# Patient Record
Sex: FEMALE | Race: WHITE | NOT HISPANIC OR LATINO | Employment: FULL TIME | URBAN - METROPOLITAN AREA
[De-identification: names, ages, dates, MRNs, and addresses within clinical notes are randomized per-mention and may not be internally consistent; named-entity substitution may affect disease eponyms.]

---

## 2021-04-08 DIAGNOSIS — Z23 ENCOUNTER FOR IMMUNIZATION: ICD-10-CM

## 2022-03-12 ENCOUNTER — APPOINTMENT (EMERGENCY)
Dept: RADIOLOGY | Facility: HOSPITAL | Age: 57
End: 2022-03-12
Payer: COMMERCIAL

## 2022-03-12 ENCOUNTER — HOSPITAL ENCOUNTER (OUTPATIENT)
Facility: HOSPITAL | Age: 57
Setting detail: OBSERVATION
Discharge: HOME | End: 2022-03-13
Attending: EMERGENCY MEDICINE | Admitting: INTERNAL MEDICINE
Payer: COMMERCIAL

## 2022-03-12 DIAGNOSIS — T78.3XXA ANGIOEDEMA, INITIAL ENCOUNTER: Primary | ICD-10-CM

## 2022-03-12 DIAGNOSIS — K21.9 GASTROESOPHAGEAL REFLUX DISEASE, UNSPECIFIED WHETHER ESOPHAGITIS PRESENT: ICD-10-CM

## 2022-03-12 DIAGNOSIS — R93.89 ABNORMAL CHEST X-RAY: ICD-10-CM

## 2022-03-12 PROBLEM — F41.9 ANXIETY: Status: ACTIVE | Noted: 2022-03-12

## 2022-03-12 LAB
ALBUMIN SERPL BCP-MCNC: 4 G/DL (ref 3.5–5)
ALP SERPL-CCNC: 102 U/L (ref 46–116)
ALT SERPL W P-5'-P-CCNC: 36 U/L (ref 12–78)
ANION GAP SERPL CALCULATED.3IONS-SCNC: 9 MMOL/L (ref 4–13)
APTT PPP: 27 SECONDS (ref 23–37)
AST SERPL W P-5'-P-CCNC: 25 U/L (ref 5–45)
ATRIAL RATE: 71 BPM
BASOPHILS # BLD AUTO: 0.02 THOUSANDS/ΜL (ref 0–0.1)
BASOPHILS NFR BLD AUTO: 0 % (ref 0–1)
BILIRUB SERPL-MCNC: 0.33 MG/DL (ref 0.2–1)
BUN SERPL-MCNC: 21 MG/DL (ref 5–25)
CALCIUM SERPL-MCNC: 9.3 MG/DL (ref 8.3–10.1)
CHLORIDE SERPL-SCNC: 103 MMOL/L (ref 100–108)
CO2 SERPL-SCNC: 28 MMOL/L (ref 21–32)
CREAT SERPL-MCNC: 0.74 MG/DL (ref 0.6–1.3)
EOSINOPHIL # BLD AUTO: 0.08 THOUSAND/ΜL (ref 0–0.61)
EOSINOPHIL NFR BLD AUTO: 1 % (ref 0–6)
ERYTHROCYTE [DISTWIDTH] IN BLOOD BY AUTOMATED COUNT: 12.6 % (ref 11.6–15.1)
GFR SERPL CREATININE-BSD FRML MDRD: 90 ML/MIN/1.73SQ M
GLUCOSE SERPL-MCNC: 110 MG/DL (ref 65–140)
HCT VFR BLD AUTO: 39.4 % (ref 34.8–46.1)
HGB BLD-MCNC: 13.5 G/DL (ref 11.5–15.4)
IMM GRANULOCYTES # BLD AUTO: 0.03 THOUSAND/UL (ref 0–0.2)
IMM GRANULOCYTES NFR BLD AUTO: 1 % (ref 0–2)
INR PPP: 0.9 (ref 0.84–1.19)
LYMPHOCYTES # BLD AUTO: 1.69 THOUSANDS/ΜL (ref 0.6–4.47)
LYMPHOCYTES NFR BLD AUTO: 30 % (ref 14–44)
MCH RBC QN AUTO: 31 PG (ref 26.8–34.3)
MCHC RBC AUTO-ENTMCNC: 34.3 G/DL (ref 31.4–37.4)
MCV RBC AUTO: 90 FL (ref 82–98)
MONOCYTES # BLD AUTO: 0.52 THOUSAND/ΜL (ref 0.17–1.22)
MONOCYTES NFR BLD AUTO: 9 % (ref 4–12)
NEUTROPHILS # BLD AUTO: 3.25 THOUSANDS/ΜL (ref 1.85–7.62)
NEUTS SEG NFR BLD AUTO: 59 % (ref 43–75)
NRBC BLD AUTO-RTO: 0 /100 WBCS
P AXIS: 64 DEGREES
PLATELET # BLD AUTO: 287 THOUSANDS/UL (ref 149–390)
PMV BLD AUTO: 9.3 FL (ref 8.9–12.7)
POTASSIUM SERPL-SCNC: 4.2 MMOL/L (ref 3.5–5.3)
PR INTERVAL: 132 MS
PROT SERPL-MCNC: 7.2 G/DL (ref 6.4–8.2)
PROTHROMBIN TIME: 12.2 SECONDS (ref 11.6–14.5)
QRS AXIS: 53 DEGREES
QRSD INTERVAL: 88 MS
QT INTERVAL: 386 MS
QTC INTERVAL: 419 MS
RBC # BLD AUTO: 4.36 MILLION/UL (ref 3.81–5.12)
S PYO DNA THROAT QL NAA+PROBE: NOT DETECTED
SODIUM SERPL-SCNC: 140 MMOL/L (ref 136–145)
T WAVE AXIS: 67 DEGREES
VENTRICULAR RATE: 71 BPM
WBC # BLD AUTO: 5.59 THOUSAND/UL (ref 4.31–10.16)

## 2022-03-12 PROCEDURE — 86160 COMPLEMENT ANTIGEN: CPT | Performed by: EMERGENCY MEDICINE

## 2022-03-12 PROCEDURE — 85025 COMPLETE CBC W/AUTO DIFF WBC: CPT | Performed by: EMERGENCY MEDICINE

## 2022-03-12 PROCEDURE — 93005 ELECTROCARDIOGRAM TRACING: CPT

## 2022-03-12 PROCEDURE — 85610 PROTHROMBIN TIME: CPT | Performed by: EMERGENCY MEDICINE

## 2022-03-12 PROCEDURE — 99291 CRITICAL CARE FIRST HOUR: CPT | Performed by: EMERGENCY MEDICINE

## 2022-03-12 PROCEDURE — 85730 THROMBOPLASTIN TIME PARTIAL: CPT | Performed by: EMERGENCY MEDICINE

## 2022-03-12 PROCEDURE — 96372 THER/PROPH/DIAG INJ SC/IM: CPT

## 2022-03-12 PROCEDURE — 96374 THER/PROPH/DIAG INJ IV PUSH: CPT

## 2022-03-12 PROCEDURE — 80053 COMPREHEN METABOLIC PANEL: CPT | Performed by: EMERGENCY MEDICINE

## 2022-03-12 PROCEDURE — 36415 COLL VENOUS BLD VENIPUNCTURE: CPT | Performed by: EMERGENCY MEDICINE

## 2022-03-12 PROCEDURE — 93010 ELECTROCARDIOGRAM REPORT: CPT | Performed by: INTERNAL MEDICINE

## 2022-03-12 PROCEDURE — 96375 TX/PRO/DX INJ NEW DRUG ADDON: CPT

## 2022-03-12 PROCEDURE — 99285 EMERGENCY DEPT VISIT HI MDM: CPT

## 2022-03-12 PROCEDURE — 71045 X-RAY EXAM CHEST 1 VIEW: CPT

## 2022-03-12 PROCEDURE — 87651 STREP A DNA AMP PROBE: CPT | Performed by: EMERGENCY MEDICINE

## 2022-03-12 PROCEDURE — 99223 1ST HOSP IP/OBS HIGH 75: CPT | Performed by: INTERNAL MEDICINE

## 2022-03-12 RX ORDER — LORAZEPAM 2 MG/ML
0.5 INJECTION INTRAMUSCULAR ONCE
Status: COMPLETED | OUTPATIENT
Start: 2022-03-12 | End: 2022-03-12

## 2022-03-12 RX ORDER — DIPHENHYDRAMINE HCL 25 MG
25 TABLET ORAL EVERY 6 HOURS PRN
Status: DISCONTINUED | OUTPATIENT
Start: 2022-03-12 | End: 2022-03-12

## 2022-03-12 RX ORDER — DIPHENHYDRAMINE HYDROCHLORIDE 50 MG/ML
50 INJECTION INTRAMUSCULAR; INTRAVENOUS ONCE
Status: COMPLETED | OUTPATIENT
Start: 2022-03-12 | End: 2022-03-12

## 2022-03-12 RX ORDER — PREDNISONE 20 MG/1
40 TABLET ORAL DAILY
Status: DISCONTINUED | OUTPATIENT
Start: 2022-03-13 | End: 2022-03-13 | Stop reason: HOSPADM

## 2022-03-12 RX ORDER — DIPHENHYDRAMINE HYDROCHLORIDE 50 MG/ML
50 INJECTION INTRAMUSCULAR; INTRAVENOUS EVERY 8 HOURS
Status: DISCONTINUED | OUTPATIENT
Start: 2022-03-12 | End: 2022-03-12

## 2022-03-12 RX ORDER — ATENOLOL 25 MG/1
25 TABLET ORAL DAILY
COMMUNITY

## 2022-03-12 RX ORDER — DEXAMETHASONE SODIUM PHOSPHATE 4 MG/ML
10 INJECTION, SOLUTION INTRA-ARTICULAR; INTRALESIONAL; INTRAMUSCULAR; INTRAVENOUS; SOFT TISSUE EVERY 8 HOURS SCHEDULED
Status: DISCONTINUED | OUTPATIENT
Start: 2022-03-12 | End: 2022-03-12

## 2022-03-12 RX ORDER — LORAZEPAM 1 MG/1
1 TABLET ORAL EVERY 8 HOURS PRN
COMMUNITY
End: 2022-03-30

## 2022-03-12 RX ORDER — EPINEPHRINE 1 MG/ML
0.5 INJECTION, SOLUTION, CONCENTRATE INTRAVENOUS ONCE
Status: COMPLETED | OUTPATIENT
Start: 2022-03-12 | End: 2022-03-12

## 2022-03-12 RX ORDER — DIPHENHYDRAMINE HCL 25 MG
25 TABLET ORAL EVERY 6 HOURS
Status: DISCONTINUED | OUTPATIENT
Start: 2022-03-12 | End: 2022-03-13 | Stop reason: HOSPADM

## 2022-03-12 RX ORDER — DEXAMETHASONE SODIUM PHOSPHATE 4 MG/ML
10 INJECTION, SOLUTION INTRA-ARTICULAR; INTRALESIONAL; INTRAMUSCULAR; INTRAVENOUS; SOFT TISSUE ONCE
Status: COMPLETED | OUTPATIENT
Start: 2022-03-12 | End: 2022-03-12

## 2022-03-12 RX ADMIN — DIPHENHYDRAMINE HCL 25 MG: 25 TABLET, COATED ORAL at 23:13

## 2022-03-12 RX ADMIN — LORAZEPAM 0.5 MG: 2 INJECTION INTRAMUSCULAR; INTRAVENOUS at 22:55

## 2022-03-12 RX ADMIN — DEXAMETHASONE SODIUM PHOSPHATE 10 MG: 4 INJECTION INTRA-ARTICULAR; INTRALESIONAL; INTRAMUSCULAR; INTRAVENOUS; SOFT TISSUE at 07:03

## 2022-03-12 RX ADMIN — DIPHENHYDRAMINE HYDROCHLORIDE 50 MG: 50 INJECTION, SOLUTION INTRAMUSCULAR; INTRAVENOUS at 07:05

## 2022-03-12 RX ADMIN — EPINEPHRINE 0.5 MG: 1 INJECTION, SOLUTION, CONCENTRATE INTRAVENOUS at 07:07

## 2022-03-12 RX ADMIN — DIPHENHYDRAMINE HYDROCHLORIDE 50 MG: 50 INJECTION, SOLUTION INTRAMUSCULAR; INTRAVENOUS at 13:49

## 2022-03-12 RX ADMIN — DEXAMETHASONE SODIUM PHOSPHATE 10 MG: 4 INJECTION INTRA-ARTICULAR; INTRALESIONAL; INTRAMUSCULAR; INTRAVENOUS; SOFT TISSUE at 13:49

## 2022-03-12 RX ADMIN — DIPHENHYDRAMINE HCL 25 MG: 25 TABLET, COATED ORAL at 18:17

## 2022-03-12 RX ADMIN — LORAZEPAM 0.5 MG: 2 INJECTION INTRAMUSCULAR; INTRAVENOUS at 08:04

## 2022-03-12 RX ADMIN — FAMOTIDINE 20 MG: 10 INJECTION, SOLUTION INTRAVENOUS at 07:05

## 2022-03-12 NOTE — ASSESSMENT & PLAN NOTE
History of anxiety for which she takes ativan    Plan  · Patient anxious today, given a dose of ativan in the ED  · ativan PRN

## 2022-03-12 NOTE — ASSESSMENT & PLAN NOTE
Chest X-ray on 3/12 showed elevated left diaphragm  Unclear etiology, has not had previous chest x-rays, Denies shortness of breath or difficulty breathing  · Necessitates further testing with fluoroscopy ("Sniff Test")

## 2022-03-12 NOTE — LETTER
[address] [address]  Dept: [phone]    March 13, 2022     Patient: Juan Vargas   YOB: 1965   Date of Visit: 3/12/2022       To Whom it May Concern:    Juan Vargas is under my professional care  She was seen in the hospital from 3/12/2022   to 03/13/22  She may return to work on 03/15/2022 without limitations  If you have any questions or concerns, please don't hesitate to call           Sincerely,          Jacoby Villaseñor MD

## 2022-03-12 NOTE — ASSESSMENT & PLAN NOTE
3/12 with a sensation of throat swelling, shrimp dinner the night before; no similar events in past  She did not have any airway comprimise  · She was given one dose of IM epinephrine, decadron, pepcid, and benadryl - symptoms improved  Admitted ICU for airway monitoring       Plan  · Decadron switched to prednisone 40mg x5 days, D2/5  · benadryl 25mg q8 --> q12  · Epinephrine IM as needed should her swelling return  · Discharge from hospital with prednisone 40 mg for 5 days  · Discharge with Epipen and Allergy referral

## 2022-03-12 NOTE — ASSESSMENT & PLAN NOTE
· Patient presented to the emergency department on the morning of 3/12 with a sensation of throat swelling  · She endorsed eating a shrimp dinner the night before  · This has never happened to her before  · She did not have any airway comprimise  · She was given one dose of IM epinephrine, decadron, pepcid, and benadryl   -She noted improvement of her symptoms after these medications were given  · Patient was also very anxious and given a dose of ativan    Plan  · Admit to ICU for close airway monitoring  · Initiate course of steroids  · Epinephrine IM as needed should her swelling return

## 2022-03-12 NOTE — ED PROVIDER NOTES
History  Chief Complaint   Patient presents with    Breathing Difficulty     patient was woken up from her sleep around 0530 from difficulty breathing and feeling like her throat was closing  Patient is a 61year old female who had sudden onset of throat swelling, sore throat and difficulty breathing this AM  Had shrimp last night  No new soaps, detergents or medications  Not on ACEI  States she drove here  No prior episodes  No fever or cough  No chest pain  No recent old records from this ED seen on computer system  [facility] website checked on this patient and last Rx filled was on 3/1/22 for ativan for 30 day supply  Patient needed my urgent attention  History provided by:  Patient   used: No        Prior to Admission Medications   Prescriptions Last Dose Informant Patient Reported? Taking? LORazepam (ATIVAN) 1 mg tablet   Yes Yes   Sig: Take 1 mg by mouth every 8 (eight) hours as needed for anxiety   atenolol (TENORMIN) 25 mg tablet   Yes Yes   Sig: Take 25 mg by mouth daily      Facility-Administered Medications: None       Past Medical History:   Diagnosis Date    Anxiety        History reviewed  No pertinent surgical history  History reviewed  No pertinent family history  I have reviewed and agree with the history as documented  E-Cigarette/Vaping    E-Cigarette Use Never User      E-Cigarette/Vaping Substances     Social History     Tobacco Use    Smoking status: Not on file    Smokeless tobacco: Never Used   Vaping Use    Vaping Use: Never used   Substance Use Topics    Alcohol use: Yes     Comment: socially    Drug use: Not Currently       Review of Systems   Constitutional: Negative for fever  HENT: Positive for sore throat and trouble swallowing  Respiratory: Positive for shortness of breath  Negative for cough  Cardiovascular: Negative for chest pain  Gastrointestinal: Negative for nausea and vomiting     All other systems reviewed and are negative  Physical Exam  Physical Exam  Vitals and nursing note reviewed  Constitutional:       General: She is in acute distress (moderate)  HENT:      Head: Normocephalic and atraumatic  Mouth/Throat:      Mouth: Mucous membranes are moist       Pharynx: Posterior oropharyngeal erythema present  No oropharyngeal exudate  Comments: (+) left sided angioedema of uvula and left peritonsillar area  Eyes:      General: No scleral icterus  Cardiovascular:      Rate and Rhythm: Normal rate and regular rhythm  Heart sounds: Normal heart sounds  No murmur heard  Pulmonary:      Effort: Pulmonary effort is normal  No respiratory distress  Breath sounds: Normal breath sounds  No stridor  No wheezing, rhonchi or rales  Abdominal:      General: Bowel sounds are normal       Palpations: Abdomen is soft  Tenderness: There is no abdominal tenderness  Musculoskeletal:         General: No deformity  Cervical back: Normal range of motion and neck supple  Right lower leg: No edema  Left lower leg: No edema  Skin:     General: Skin is warm and dry  Findings: No erythema or rash  Neurological:      General: No focal deficit present  Mental Status: She is alert and oriented to person, place, and time  Psychiatric:      Comments: Anxious            Vital Signs  ED Triage Vitals [03/12/22 0636]   Temperature Pulse Respirations Blood Pressure SpO2   97 9 °F (36 6 °C) 80 18 163/75 100 %      Temp Source Heart Rate Source Patient Position - Orthostatic VS BP Location FiO2 (%)   Oral Monitor Sitting Left arm --      Pain Score       --           Vitals:    03/12/22 0636 03/12/22 0730   BP: 163/75 134/66   Pulse: 80 78   Patient Position - Orthostatic VS: Sitting          Visual Acuity      ED Medications  Medications   LORazepam (ATIVAN) injection 0 5 mg (has no administration in time range)   dexamethasone (DECADRON) injection 10 mg (10 mg Intravenous Given 3/12/22 0703)   diphenhydrAMINE (BENADRYL) injection 50 mg (50 mg Intravenous Given 3/12/22 0705)   famotidine (PEPCID) injection 20 mg (20 mg Intravenous Given 3/12/22 0705)   EPINEPHrine PF (ADRENALIN) 1 mg/mL injection 0 5 mg (0 5 mg Intramuscular Given 3/12/22 0707)       Diagnostic Studies  Results Reviewed     Procedure Component Value Units Date/Time    Strep A PCR [[id number]]  (Normal) Collected: 03/12/22 0700    Lab Status: Final result Specimen: Throat Updated: 03/12/22 0741     STREP A PCR Not Detected    Comprehensive metabolic panel [[id number]] Collected: 03/12/22 0659    Lab Status: Final result Specimen: Blood from Arm, Right Updated: 03/12/22 0730     Sodium 140 mmol/L      Potassium 4 2 mmol/L      Chloride 103 mmol/L      CO2 28 mmol/L      ANION GAP 9 mmol/L      BUN 21 mg/dL      Creatinine 0 74 mg/dL      Glucose 110 mg/dL      Calcium 9 3 mg/dL      AST 25 U/L      ALT 36 U/L      Alkaline Phosphatase 102 U/L      Total Protein 7 2 g/dL      Albumin 4 0 g/dL      Total Bilirubin 0 33 mg/dL      eGFR 90 ml/min/1 73sq m     Narrative:      [place] guidelines for Chronic Kidney Disease (CKD):     Stage 1 with normal or high GFR (GFR > 90 mL/min/1 73 square meters)    Stage 2 Mild CKD (GFR = 60-89 mL/min/1 73 square meters)    Stage 3A Moderate CKD (GFR = 45-59 mL/min/1 73 square meters)    Stage 3B Moderate CKD (GFR = 30-44 mL/min/1 73 square meters)    Stage 4 Severe CKD (GFR = 15-29 mL/min/1 73 square meters)    Stage 5 End Stage CKD (GFR <15 mL/min/1 73 square meters)  Note: GFR calculation is accurate only with a steady state creatinine    Protime-INR [[id number]]  (Normal) Collected: 03/12/22 0659    Lab Status: Final result Specimen: Blood from Arm, Right Updated: 03/12/22 0724     Protime 12 2 seconds      INR 0 90    APTT [[id number]]  (Normal) Collected: 03/12/22 0659    Lab Status: Final result Specimen: Blood from Arm, Right Updated: 03/12/22 0724     PTT 27 seconds     CBC and differential [[id number]] Collected: 03/12/22 0659    Lab Status: Final result Specimen: Blood from Arm, Right Updated: 03/12/22 0707     WBC 5 59 Thousand/uL      RBC 4 36 Million/uL      Hemoglobin 13 5 g/dL      Hematocrit 39 4 %      MCV 90 fL      MCH 31 0 pg      MCHC 34 3 g/dL      RDW 12 6 %      MPV 9 3 fL      Platelets 636 Thousands/uL      nRBC 0 /100 WBCs      Neutrophils Relative 59 %      Immat GRANS % 1 %      Lymphocytes Relative 30 %      Monocytes Relative 9 %      Eosinophils Relative 1 %      Basophils Relative 0 %      Neutrophils Absolute 3 25 Thousands/µL      Immature Grans Absolute 0 03 Thousand/uL      Lymphocytes Absolute 1 69 Thousands/µL      Monocytes Absolute 0 52 Thousand/µL      Eosinophils Absolute 0 08 Thousand/µL      Basophils Absolute 0 02 Thousands/µL     C1 esterase inhibitor [[id number]] Collected: 03/12/22 0659    Lab Status: In process Specimen: Blood from Arm, Right Updated: 03/12/22 0703                 XR chest 1 view portable   ED Interpretation by John Santamaria MD (03/12 9723)   Large hiatal hernia, elevated left hemidiaphragm read by me                    Procedures  ECG 12 Lead Documentation Only    Date/Time: 3/12/2022 7:05 AM  Performed by: John Santamaria MD  Authorized by: John Santamaria MD     Indications / Diagnosis:  Angioedema, sob  ECG reviewed by me, the ED Provider: yes    Patient location:  ED  Previous ECG:     Previous ECG:  Compared to current    Comparison ECG info:  8/27/99    Similarity:  No change  Interpretation:     Interpretation: normal    Rate:     ECG rate:  71    ECG rate assessment: normal    Rhythm:     Rhythm: sinus rhythm    Ectopy:     Ectopy: none    QRS:     QRS axis:  Normal    QRS intervals:  Normal  Conduction:     Conduction: normal    ST segments:     ST segments:  Normal  T waves:     T waves: normal      CriticalCare Time  Performed by: John Santamaria MD  Authorized by: John Santamaria MD     Critical care provider statement:     Critical care time (minutes):  35    Critical care time was exclusive of:  Separately billable procedures and treating other patients    Critical care was necessary to treat or prevent imminent or life-threatening deterioration of the following conditions: angioedema  Critical care was time spent personally by me on the following activities:  Obtaining history from patient or surrogate, development of treatment plan with patient or surrogate, evaluation of patient's response to treatment, examination of patient, ordering and performing treatments and interventions, ordering and review of laboratory studies, ordering and review of radiographic studies and re-evaluation of patient's condition    I assumed direction of critical care for this patient from another provider in my specialty: no               ED Course  ED Course as of 03/12/22 0755   Sat Mar 12, 2022   0738 Labs and CXR d/w patient  Patient with increased anxiety so IV ativan ordered  MDM  Number of Diagnoses or Management Options  Diagnosis management comments: DDx including but not limited to: angioedema, allergic reaction, anaphylaxis, airway compromise, cellulitis, pharyngitis, abscess  Amount and/or Complexity of Data Reviewed  Clinical lab tests: ordered and reviewed  Tests in the radiology section of CPT®: ordered and reviewed  Decide to obtain previous medical records or to obtain history from someone other than the patient: yes  Discuss the patient with other providers: yes  Independent visualization of images, tracings, or specimens: yes        Disposition  Final diagnoses:   Angioedema, initial encounter     Time reflects when diagnosis was documented in both MDM as applicable and the Disposition within this note     Time User Action Codes Description Comment    3/12/2022  6:52 AM Irais, [address] [place] [address]  3XXA Angioedema, initial encounter ED Disposition     ED Disposition Condition Date/Time Comment    Admit Stable Sat Mar 12, 2022  7:54 AM Case was discussed with Dr Argentina Blount and the patient's admission status was agreed to be Admission Status: observation status to the service of Dr Argentina Blount   Follow-up Information    None         Patient's Medications   Discharge Prescriptions    No medications on file       No discharge procedures on file      PDMP Review       Value Time User    PDMP Reviewed  Yes 3/12/2022  6:42 AM Madisyn Perez MD          ED Provider  Electronically Signed by           Madisyn Perez MD  03/12/22 6459

## 2022-03-12 NOTE — H&P
[address] 1965, 61 y o  female MRN: [id number]  Unit/Bed#: ED 11 Encounter: [id number]  Primary Care Provider: Anny Ambrose MD   Date and time admitted to hospital: 3/12/2022  6:40 AM    Anxiety  Assessment & Plan  · History of anxiety for which she takes ativan    Plan  · Patient anxious today, given a dose of ativan in the ED  · Consider ativan PRN    * Angioedema  Assessment & Plan  · Patient presented to the emergency department on the morning of 3/12 with a sensation of throat swelling  · She endorsed eating a shrimp dinner the night before  · This has never happened to her before  · She did not have any airway comprimise  · She was given one dose of IM epinephrine, decadron, pepcid, and benadryl   -She noted improvement of her symptoms after these medications were given  · Patient was also very anxious and given a dose of ativan    Plan  · Admit to ICU for close airway monitoring  · Initiate course of steroids  · Epinephrine IM as needed should her swelling return    -------------------------------------------------------------------------------------------------------------  Chief Complaint: Throat swelling    History of Present Illness   HX and PE limited by: Carlos Alford is a 61 y o  female who presented to the ED the morning of 3/12 with a sensation of sore throat and throat swelling with difficulty breathing  She denies any fevers or cough  Patient describes having a shrimp dinner the night prior  She denies any prior allergic reactions or episodes of angioedema  There is no known family history of hereditary angioedema  History obtained from the patient, chart review    -------------------------------------------------------------------------------------------------------------  Dispo: Transfer to Stepdown Level 1     Code Status: Level 1 - Full Code  --------------------------------------------------------------------------------------------------------------  Review of Systems   Constitutional: Negative for chills and fever  HENT: Positive for trouble swallowing  Negative for drooling, ear pain and sore throat  Sensation of throat swelling   Eyes: Negative for pain and visual disturbance  Respiratory: Negative for cough, choking, shortness of breath, wheezing and stridor  Cardiovascular: Negative for chest pain and palpitations  Gastrointestinal: Negative for abdominal pain, diarrhea and vomiting  Genitourinary: Negative for dysuria and hematuria  Musculoskeletal: Negative for arthralgias and back pain  Skin: Negative for color change and rash  Allergic/Immunologic: Negative for food allergies  Neurological: Negative for seizures and syncope  Psychiatric/Behavioral: The patient is nervous/anxious  All other systems reviewed and are negative  A 12-point, complete review of systems was reviewed and negative except as stated above     Physical Exam  Vitals and nursing note reviewed  Constitutional:       Comments: Appears anxious   HENT:      Head: Normocephalic and atraumatic  Mouth/Throat:      Mouth: Mucous membranes are moist       Pharynx: Oropharynx is clear  Eyes:      General: No scleral icterus  Conjunctiva/sclera: Conjunctivae normal    Cardiovascular:      Rate and Rhythm: Normal rate and regular rhythm  Heart sounds: Normal heart sounds  Pulmonary:      Effort: Pulmonary effort is normal  No respiratory distress  Breath sounds: Normal breath sounds  Abdominal:      General: Abdomen is flat  There is no distension  Tenderness: There is no abdominal tenderness  Musculoskeletal:         General: No tenderness or signs of injury  Cervical back: Neck supple  No rigidity  Skin:     General: Skin is warm  Coloration: Skin is not jaundiced        Findings: No erythema or rash    Neurological:      General: No focal deficit present  Mental Status: She is alert  Mental status is at baseline  Psychiatric:         Mood and Affect: Mood normal          Behavior: Behavior normal        --------------------------------------------------------------------------------------------------------------  Vitals:   Vitals:    03/12/22 0636 03/12/22 0730 03/12/22 0800   BP: 163/75 134/66 150/70   BP Location: Left arm     Pulse: 80 78 76   Resp: 18 18 18   Temp: 97 9 °F (36 6 °C)     TempSrc: Oral     SpO2: 100% 99% 99%   Weight: 63 kg (139 lb)     Height: 5' 4" (1 626 m)       Temp  Min: 97 9 °F (36 6 °C)  Max: 97 9 °F (36 6 °C)     Height: 5' 4" (162 6 cm)  Body mass index is 23 86 kg/m²  Laboratory and Diagnostics:  Results from last 7 days   Lab Units 03/12/22  0659   WBC Thousand/uL 5 59   HEMOGLOBIN g/dL 13 5   HEMATOCRIT % 39 4   PLATELETS Thousands/uL 287   NEUTROS PCT % 59   MONOS PCT % 9     Results from last 7 days   Lab Units 03/12/22  0659   SODIUM mmol/L 140   POTASSIUM mmol/L 4 2   CHLORIDE mmol/L 103   CO2 mmol/L 28   ANION GAP mmol/L 9   BUN mg/dL 21   CREATININE mg/dL 0 74   CALCIUM mg/dL 9 3   GLUCOSE RANDOM mg/dL 110   ALT U/L 36   AST U/L 25   ALK PHOS U/L 102   ALBUMIN g/dL 4 0   TOTAL BILIRUBIN mg/dL 0 33          Results from last 7 days   Lab Units 03/12/22  0659   INR  0 90   PTT seconds 27              ABG:    VBG:          Micro:        EKG: Normal sinus rhythm   Imaging: I have personally reviewed pertinent films in PACS      Historical Information   Past Medical History:   Diagnosis Date    Anxiety      History reviewed  No pertinent surgical history    Social History   Social History     Substance and Sexual Activity   Alcohol Use Yes    Comment: socially     Social History     Substance and Sexual Activity   Drug Use Not Currently     Social History     Tobacco Use   Smoking Status Not on file   Smokeless Tobacco Never Used     Exercise History:   Family History:   History reviewed  No pertinent family history  I have reviewed this patient's family history and commented on sigificant items within the HPI      Medications:  No current facility-administered medications for this encounter  Home medications:  Prior to Admission Medications   Prescriptions Last Dose Informant Patient Reported? Taking? LORazepam (ATIVAN) 1 mg tablet   Yes Yes   Sig: Take 1 mg by mouth every 8 (eight) hours as needed for anxiety   atenolol (TENORMIN) 25 mg tablet   Yes Yes   Sig: Take 25 mg by mouth daily      Facility-Administered Medications: None     Allergies:  No Known Allergies    ------------------------------------------------------------------------------------------------------------  Advance Directive and Living Will:      Power of :    POLST:    ------------------------------------------------------------------------------------------------------------  Anticipated Length of Stay is > 2 midnights    Care Time Delivered:   No Critical Care time spent       [address] [facility], DO        Portions of the record may have been created with voice recognition software  Occasional wrong word or "sound a like" substitutions may have occurred due to the inherent limitations of voice recognition software    Read the chart carefully and recognize, using context, where substitutions have occurred

## 2022-03-13 VITALS
OXYGEN SATURATION: 99 % | SYSTOLIC BLOOD PRESSURE: 100 MMHG | DIASTOLIC BLOOD PRESSURE: 59 MMHG | HEIGHT: 64 IN | TEMPERATURE: 97.6 F | HEART RATE: 61 BPM | WEIGHT: 141.76 LBS | RESPIRATION RATE: 25 BRPM | BODY MASS INDEX: 24.2 KG/M2

## 2022-03-13 PROBLEM — K21.9 ACID REFLUX: Status: ACTIVE | Noted: 2022-03-13

## 2022-03-13 PROCEDURE — NC001 PR NO CHARGE: Performed by: INTERNAL MEDICINE

## 2022-03-13 PROCEDURE — 99238 HOSP IP/OBS DSCHRG MGMT 30/<: CPT | Performed by: INTERNAL MEDICINE

## 2022-03-13 RX ORDER — FAMOTIDINE 20 MG/1
20 TABLET, FILM COATED ORAL 2 TIMES DAILY
Qty: 30 TABLET | Refills: 0 | Status: SHIPPED | OUTPATIENT
Start: 2022-03-13 | End: 2022-03-30

## 2022-03-13 RX ORDER — DIPHENHYDRAMINE HCL 25 MG
25 TABLET ORAL EVERY 12 HOURS PRN
Qty: 30 TABLET | Refills: 0 | Status: SHIPPED | OUTPATIENT
Start: 2022-03-13 | End: 2022-03-30

## 2022-03-13 RX ORDER — PREDNISONE 20 MG/1
40 TABLET ORAL DAILY
Qty: 6 TABLET | Refills: 0 | Status: SHIPPED | OUTPATIENT
Start: 2022-03-14 | End: 2022-03-17

## 2022-03-13 RX ADMIN — DIPHENHYDRAMINE HCL 25 MG: 25 TABLET, COATED ORAL at 05:54

## 2022-03-13 RX ADMIN — PREDNISONE 40 MG: 20 TABLET ORAL at 08:16

## 2022-03-13 NOTE — DISCHARGE SUMMARY
Admission Date: 3/12/2022     Discharge Date: 03/13/2022    Admitting Diagnoses:   1  Angioedema  2  Severe Left elevation of liset-diaphragm  3  Acid reflux    Discharge Diagnoses:  same      Procedures Performed:   Orders Placed This Encounter   Procedures   [address] ED ECG Documentation Only       Hospital Course:   Patient admitted with Angioedema to ICU for concern of airway compromise  Treated with decadron, transitioned to po prednisone with great improvement in symptoms  Dc with prednisone taper  Allergy referral as unknown etiology  Complaining of new GERD, GI referral  pepcid on dc  Elevated diaphragm, pulm referral, CT Chest wo con and Sniff test outpatient  Significant Findings, Care, Treatment and Services Provided:   XR chest 1 view portable    Result Date: 3/12/2022  Impression: Severe elevation of the left hemidiaphragm  Workstation performed: [id number]       XR chest 1 view portable    Result Date: 3/12/2022  Impression Severe elevation of the left hemidiaphragm  Workstation performed: [id number]        Complications:   none    Condition at Discharge: good     Discharge instructions/Information to patient and family:   See after visit summary for information provided to patient and family  Provisions for Follow-Up Care:  See after visit summary for information related to follow-up care and any pertinent home health orders  Disposition: Home    Discharge Statement   I spent 20 minutes discharging the patient  This time was spent on the day of discharge  I had direct contact with the patient on the day of discharge  Additional documentation is required if more than 30 minutes were spent on discharge  Discharge Medications:  See after visit summary for reconciled discharge medications provided to patient and family

## 2022-03-13 NOTE — DISCHARGE INSTRUCTIONS
Dear Kamla Quach,     It was our pleasure to care for you here at [facility]  It is our hope that we were always able to exceed the expected standards for your care during your stay  You were hospitalized due to angioedema  You were cared for on the 2nd floor by Mc York MD under the service of Mohsen Leslie DO with the [address] [place] [address] [facility] who covers for your primary care physician (PCP), Adolfo Obrien MD, while you were hospitalized  If you have any questions or concerns related to this hospitalization, you may contact us at [phone]  For follow up as well as any medication refills, we recommend that you follow up with your primary care physician  A registered nurse will reach out to you by phone within a few days after your discharge to answer any additional questions that you may have after going home  However, at this time we provide for you here, the most important instructions / recommendations at discharge:     · Notable Medication Adjustments -   · Steroid taper for 3 more days  · Take benadryl aevery 12 hours as needed for throat discomfort  · Testing Required after Discharge -   · CT chest  · Sniff test for diaphragm  · Important follow up information -   · List of follow up providers given  · Other Instructions -   · none  · Please review this entire after visit summary as additional general instructions including medication list, appointments, activity, diet, any pertinent wound care, and other additional recommendations from your care team that may be provided for you        Sincerely,     Mc York MD

## 2022-03-13 NOTE — PROGRESS NOTES
Jeanie  Progress Note - Lucy Organ 1965, 61 y o  female MRN: [id number]  Unit/Bed#: ICU 06 Encounter: [id number]  Primary Care Provider: Ivory Thomas MD   Date and time admitted to hospital: 3/12/2022  6:40 AM    * Angioedema  Assessment & Plan  3/12 with a sensation of throat swelling, shrimp dinner the night before; no similar events in past  She did not have any airway comprimise  · She was given one dose of IM epinephrine, decadron, pepcid, and benadryl - symptoms improved  Admitted ICU for airway monitoring       Plan  · Decadron switched to prednisone 40mg x5 days, D2/5  · benadryl 25mg q8 --> q12  · Epinephrine IM as needed should her swelling return  · Discharge from hospital with prednisone 40 mg for 5 days  · Discharge with Epipen and Allergy referral    Abnormal chest x-ray  Assessment & Plan  Chest X-ray on 3/12 showed elevated left diaphragm  Unclear etiology, has not had previous chest x-rays, Denies shortness of breath or difficulty breathing  · Necessitates further testing with fluoroscopy ("Sniff Test")    Anxiety  Assessment & Plan  History of anxiety for which she takes ativan    Plan  · Patient anxious today, given a dose of ativan in the ED  · ativan PRN      ----------------------------------------------------------------------------------------  HPI/24hr events: anxious overnight received ativan x 1    Hemodynamically stable, with MAPs >65  No new labs however yesterday WNL    No new imaging    WT  IO -   PPx - not indicated  L/T/D - peripherals    Patient appropriate for transfer out of the ICU today?: Patient does not meet criteria for ICU Follow-up Clinic; referral has not been made  Disposition: Discharge to home   Code Status: Level 1 - Full Code  ---------------------------------------------------------------------------------------  SUBJECTIVE  No SOB or throat closing sensation  No cough  Tolerated diet well yesterday   No nausea vomiting  Didn't sleep too well  Feels she will have BM today  Reports concerns for GERD and low diaphragm  Review of Systems  Review of systems was reviewed and negative unless stated above in HPI/24-hour events   ---------------------------------------------------------------------------------------  OBJECTIVE    Vitals   Vitals:    22 2100 22 2200 22 0500   BP:       BP Location:       Pulse: 80 79 84    Resp: (!)     Temp:       TempSrc:       SpO2: 93% 95% 94%    Weight:    64 3 kg (141 lb 12 1 oz)   Height:         Temp (24hrs), Av 8 °F (37 1 °C), Min:98 7 °F (37 1 °C), Max:98 9 °F (37 2 °C)  Current: Temperature: 98 7 °F (37 1 °C)          Respiratory:  SpO2: SpO2: 94 %, SpO2 Activity: SpO2 Activity: At Rest, SpO2 Device: O2 Device: None (Room air)       Invasive/non-invasive ventilation settings   Respiratory  Report   Lab Data (Last 4 hours)    None         O2/Vent Data (Last 4 hours)    None                Physical Exam  Vitals and nursing note reviewed  Constitutional:       General: She is not in acute distress  Appearance: Normal appearance  She is well-developed  She is not ill-appearing or diaphoretic  HENT:      Head: Normocephalic and atraumatic  Mouth/Throat:      Mouth: Mucous membranes are moist       Dentition: Normal dentition  Tongue: No lesions  Tongue does not deviate from midline  Palate: No mass and lesions  Pharynx: Uvula midline  Pharyngeal swelling and posterior oropharyngeal erythema present  Tonsils: No tonsillar exudate or tonsillar abscesses  Cardiovascular:      Rate and Rhythm: Normal rate and regular rhythm  Heart sounds: No murmur heard  Pulmonary:      Effort: Pulmonary effort is normal  No respiratory distress  Breath sounds: Normal breath sounds  Abdominal:      General: Bowel sounds are normal  There is no distension  Palpations: Abdomen is soft  Tenderness:  There is no abdominal tenderness  Musculoskeletal:      Cervical back: Neck supple  Right lower leg: No edema  Left lower leg: No edema  Skin:     General: Skin is warm and dry  Neurological:      General: No focal deficit present  Mental Status: She is alert and oriented to person, place, and time  Mental status is at baseline  Psychiatric:         Mood and Affect: Mood normal          Behavior: Behavior normal          Thought Content: Thought content normal          Judgment: Judgment normal              Laboratory and Diagnostics:  Results from last 7 days   Lab Units 03/12/22  0659   WBC Thousand/uL 5 59   HEMOGLOBIN g/dL 13 5   HEMATOCRIT % 39 4   PLATELETS Thousands/uL 287   NEUTROS PCT % 59   MONOS PCT % 9     Results from last 7 days   Lab Units 03/12/22  0659   SODIUM mmol/L 140   POTASSIUM mmol/L 4 2   CHLORIDE mmol/L 103   CO2 mmol/L 28   ANION GAP mmol/L 9   BUN mg/dL 21   CREATININE mg/dL 0 74   CALCIUM mg/dL 9 3   GLUCOSE RANDOM mg/dL 110   ALT U/L 36   AST U/L 25   ALK PHOS U/L 102   ALBUMIN g/dL 4 0   TOTAL BILIRUBIN mg/dL 0 33          Results from last 7 days   Lab Units 03/12/22  0659   INR  0 90   PTT seconds 27              ABG:    VBG:          Micro        EKG: Sinus Rashad in 40s  Imaging: I have personally reviewed pertinent reports  Intake and Output  I/O     None          Height and Weights   Height: 5' 4" (162 6 cm)     Body mass index is 24 33 kg/m²  Weight (last 2 days)     Date/Time Weight    03/13/22 0500 64 3 (141 76)    03/12/22 1143 65 9 (145 28)    03/12/22 0636 63 (139)            Nutrition       Diet Orders   (From admission, onward)             Start     Ordered    03/12/22 1254  Diet Regular; Regular House  Diet effective now        References:    Nutrtion Support Algorithm Enteral vs  Parenteral   Question Answer Comment   Diet Type Regular    Regular Regular House    RD to adjust diet per protocol?  Yes        03/12/22 1253    03/12/22 1245  Room Service  Once        Question:  Type of Service  Answer:  Room Service-Appropriate    03/12/22 1245                  Active Medications  Scheduled Meds:  Current Facility-Administered Medications   Medication Dose Route Frequency Provider Last Rate    diphenhydrAMINE  25 mg Oral Q6H Alexandru Shields DO      predniSONE  40 mg Oral Daily Alexandru Nolan DO       Continuous Infusions:     PRN Meds:        Invasive Devices Review  Invasive Devices  Report    Peripheral Intravenous Line            Peripheral IV 03/12/22 Right Antecubital <1 day                Rationale for remaining devices: above  ---------------------------------------------------------------------------------------  Advance Directive and Living Will:      Power of :    POLST:    ---------------------------------------------------------------------------------------      Sourav Lofton MD      Portions of the record may have been created with voice recognition software  Occasional wrong word or "sound a like" substitutions may have occurred due to the inherent limitations of voice recognition software    Read the chart carefully and recognize, using context, where substitutions have occurred

## 2022-03-13 NOTE — CASE MANAGEMENT
Case Management Progress Note    Patient name Nidhi Wilson  Location ICU 06/ICU 06 MRN [id number]  : 1965 Date 3/13/2022       LOS (days): 0  Geometric Mean LOS (GMLOS) (days):   Days to GMLOS:        OBJECTIVE:        Current admission status: Observation  Preferred Pharmacy:   [id number], [address] [address]  Phone: [phone] Fax: [phone]    Primary Care Provider: Kathy Butts MD    Primary Insurance:   Secondary Insurance:     PROGRESS NOTE:      CM consult received for Dispo Planning  OBS notice was signed on admission  Per discussion with primary nurse and critical care team patient has no CM DC needs at this time  Care manager will respond upon request or reassess patient for discharge needs as appropriate

## 2022-03-13 NOTE — UTILIZATION REVIEW
Initial Clinical Review    Admission: Date/Time/Statement:   Admission Orders (From admission, onward)     Ordered        03/12/22 0754  Place in Observation  Once                      Orders Placed This Encounter   Procedures    Place in Observation     Standing Status:   Standing     Number of Occurrences:   1     Order Specific Question:   Level of Care     Answer:   Level 1 Stepdown [13]     ED Arrival Information     Expected Arrival Acuity    - 3/12/2022 06:17 Emergent         Means of arrival Escorted by Service Admission type    Walk-In Self Critical Care/ICU Emergency         Arrival complaint    Trouble breathing        Chief Complaint   Patient presents with    Breathing Difficulty     patient was woken up from her sleep around 0530 from difficulty breathing and feeling like her throat was closing  Initial Presentation: 60 yo female who presents to ED from home with sensation of sore throat and throat swelling with difficulty breathing  Atre shrimp night before  Deniesprior allergic reactions or episodes of angioedema  On exam, pt appears anxious, normal lung sounds, posterior oropharyngeal erythema present  No oropharyngeal exudate  (+) left sided angioedema of uvula and left peritonsillar area  CXR shows severe elevation L hemidiaphragm  Labs unremarkable  Pt given one dose of IM epinephrine, decadron, pepcid, and benadryl in ED with improvement in symptoms  Pt admitted as OBS to level 1 SD/critical care with angioedema  Plan - close airway monitoring,  Po steroids, IM Epinephrine as needed if swelling returns                   ED Triage Vitals   Temperature Pulse Respirations Blood Pressure SpO2   03/12/22 0636 03/12/22 0636 03/12/22 0636 03/12/22 0636 03/12/22 0636   97 9 °F (36 6 °C) 80 18 163/75 100 %      Temp Source Heart Rate Source Patient Position - Orthostatic VS BP Location FiO2 (%)   03/12/22 0636 03/12/22 0636 03/12/22 0636 03/12/22 0636 --   Oral Monitor Sitting Left arm       Pain Score       03/12/22 1147       No Pain          Wt Readings from Last 1 Encounters:   03/13/22 64 3 kg (141 lb 12 1 oz)     Additional Vital Signs:   Date/Time Temp Pulse Resp BP MAP (mmHg) SpO2   03/12/22 2200 -- 84 21 -- -- 94 %   03/12/22 2100 -- 79 21 -- -- 95 %   03/12/22 2000 -- 80 23 Abnormal  -- -- 93 %   03/12/22 1921 98 7 °F (37 1 °C) 78 33 Abnormal  116/63 83 95 %   03/12/22 1541 98 8 °F (37 1 °C) 76 23 Abnormal  97/56 71 94 %   03/12/22 1143 98 9 °F (37 2 °C) 80 3 Abnormal  120/74 92 95 %   03/12/22 1100 -- 80 18 112/64 84 95 %   03/12/22 1030 -- 80 18 117/76 92 95 %   03/12/22 1000 -- 82 18 132/69 96 96 %   03/12/22 0930 -- 74 18 133/70 82 96 %   03/12/22 0900 -- 68 18 114/64 84 96 %   03/12/22 0800 -- 76 18 150/70 101 99 %   03/12/22 0730 -- 78 18 134/66 93 99 %     Date and Time Eye Opening Best Verbal Response Best Motor Response [place] Coma Scale Score   03/12/22 2000 4 5 6 15   03/12/22 0827 4 5 6 15       Pertinent Labs/Diagnostic Test Results:   3/12 ECG-Normal sinus rhythm    XR chest 1 view portable   ED Interpretation by Ajay Tobin MD (03/12 6924)   Large hiatal hernia, elevated left hemidiaphragm read by me  Final Result by Josse Downs MD (03/12 2200)      Severe elevation of the left hemidiaphragm                    Workstation performed: [id number]               Results from last 7 days   Lab Units 03/12/22  0659   WBC Thousand/uL 5 59   HEMOGLOBIN g/dL 13 5   HEMATOCRIT % 39 4   PLATELETS Thousands/uL 287   NEUTROS ABS Thousands/µL 3 25         Results from last 7 days   Lab Units 03/12/22  0659   SODIUM mmol/L 140   POTASSIUM mmol/L 4 2   CHLORIDE mmol/L 103   CO2 mmol/L 28   ANION GAP mmol/L 9   BUN mg/dL 21   CREATININE mg/dL 0 74   EGFR ml/min/1 73sq m 90   CALCIUM mg/dL 9 3     Results from last 7 days   Lab Units 03/12/22  0659   AST U/L 25   ALT U/L 36   ALK PHOS U/L 102   TOTAL PROTEIN g/dL 7 2   ALBUMIN g/dL 4 0   TOTAL BILIRUBIN mg/dL 0 33 Results from last 7 days   Lab Units 03/12/22  0659   GLUCOSE RANDOM mg/dL 110                   Results from last 7 days   Lab Units 03/12/22  0659   PROTIME seconds 12 2   INR  0 90   PTT seconds 27                 ED Treatment:   Medication Administration from 03/12/2022 0617 to 03/12/2022 1141       Date/Time Order Dose Route Action     03/12/2022 0703 dexamethasone (DECADRON) injection 10 mg 10 mg Intravenous Given     03/12/2022 0705 diphenhydrAMINE (BENADRYL) injection 50 mg 50 mg Intravenous Given     03/12/2022 0705 famotidine (PEPCID) injection 20 mg 20 mg Intravenous Given     03/12/2022 0707 EPINEPHrine PF (ADRENALIN) 1 mg/mL injection 0 5 mg 0 5 mg Intramuscular Given     03/12/2022 0804 LORazepam (ATIVAN) injection 0 5 mg 0 5 mg Intravenous Given        Past Medical History:   Diagnosis Date    Anxiety      Present on Admission:  **None**      Admitting Diagnosis: Breathing difficulty [R06 89]  Angioedema, initial encounter [T78  3XXA]  Age/Sex: 61 y o  female  Admission Orders:  Scheduled Medications:  diphenhydrAMINE, 25 mg, Oral, Q6H  predniSONE, 40 mg, Oral, Daily    LORazepam (ATIVAN) injection 0 5 mg  Dose: 0 5 mg  Freq: Once Route: IV x1 3/12 @2255  Continuous IV Infusions:     PRN Meds:     Neuro checks  SCD  cardiopulm monitoring  Dysphagia assessment  Up w/ assist      IP CONSULT TO CASE MANAGEMENT    Nolan Cabral RN  Network Utilization Review Department  ATTENTION: Please call with any questions or concerns to [phone] and carefully listen to the prompts so that you are directed to the right person  All voicemails are confidential   [facility] all requests for admission clinical reviews, approved or denied determinations and any other requests to dedicated fax number below belonging to the campus where the patient is receiving treatment   List of dedicated fax numbers for the Facilities:  FACILITY NAME N[facility]STOR [address] 25 DENIALS (Administrative/Medical Necessity) [phone] [address] (Maternity/NICU/Pediatrics) 261 [facility] 40 125 [facility]  [phone]   [address] [id number] [address] [facility] [place] [address] [phone]

## 2022-03-14 LAB — C1INH SERPL-MCNC: 42 MG/DL (ref 21–39)

## 2022-04-12 ENCOUNTER — HOSPITAL ENCOUNTER (OUTPATIENT)
Dept: CT IMAGING | Facility: HOSPITAL | Age: 57
Discharge: HOME | End: 2022-04-12
Payer: COMMERCIAL

## 2022-04-12 ENCOUNTER — HOSPITAL ENCOUNTER (OUTPATIENT)
Dept: RADIOLOGY | Facility: HOSPITAL | Age: 57
Discharge: HOME | End: 2022-04-12
Payer: COMMERCIAL

## 2022-04-12 DIAGNOSIS — R93.89 ABNORMAL CHEST X-RAY: ICD-10-CM

## 2022-04-12 PROCEDURE — G1004 CDSM NDSC: HCPCS

## 2022-04-12 PROCEDURE — 71250 CT THORAX DX C-: CPT

## 2022-04-12 PROCEDURE — 76000 FLUOROSCOPY <1 HR PHYS/QHP: CPT

## 2022-04-18 RX ORDER — ATENOLOL 50 MG/1
TABLET ORAL
COMMUNITY
Start: 2022-03-30

## 2022-04-18 RX ORDER — ATENOLOL 50 MG/1
0.5 TABLET ORAL DAILY
COMMUNITY
Start: 2022-02-28

## 2022-04-19 ENCOUNTER — OFFICE VISIT (OUTPATIENT)
Dept: PULMONOLOGY | Facility: CLINIC | Age: 57
End: 2022-04-19
Payer: COMMERCIAL

## 2022-04-19 VITALS
WEIGHT: 141 LBS | HEART RATE: 67 BPM | OXYGEN SATURATION: 99 % | BODY MASS INDEX: 24.07 KG/M2 | RESPIRATION RATE: 16 BRPM | SYSTOLIC BLOOD PRESSURE: 110 MMHG | DIASTOLIC BLOOD PRESSURE: 60 MMHG | HEIGHT: 64 IN

## 2022-04-19 DIAGNOSIS — R93.89 ABNORMAL CHEST X-RAY: ICD-10-CM

## 2022-04-19 DIAGNOSIS — T78.3XXS ANGIOEDEMA, SEQUELA: Primary | ICD-10-CM

## 2022-04-19 PROCEDURE — 99214 OFFICE O/P EST MOD 30 MIN: CPT | Performed by: NURSE PRACTITIONER

## 2022-04-20 NOTE — PROGRESS NOTES
Pulmonary Follow-Up Note   Kerri Cummings 61 y o  female MRN: [id number]  4/20/2022      Assessment/Plan:    Problem List Items Addressed This Visit        Other    Angioedema - Primary     -  S/p after consuming suspected undercooked shellfish  -  proximally 6-8 hours after consumption patient experienced increased throat swelling and shortness of breath  -  symptoms resolved with administration of Decadron with prednisone taper  -  currently working closely with allergist for exact determination of provoking Agent  -  patient currently prescribed epinephrine present         Abnormal chest x-ray     -  noted elevated left hemidiaphragm  -  sniff test was suggestive of possible paralyzed left hemidiaphragm  -  given that patient is completely asymptomatic throughout her life:  No limitations in her running, gym activities, daily errands do not feel any intervention is required at this time  -  patient will follow-up with us on an as-needed basis  -  any change whatsoever in her respiratory status she will notify pulmonary office               Education provided at this visit:   Need for Vaccination:  Patient reports up-to-date   Pulmonary Rehab:  Not indicated   Smoking Cessation:  Not indicated   Lung Cancer Screening:  Not indicated   Inhaler Use:  Not indicated    No follow-ups on file  All of Scottie Acosta questions were answered prior to leaving the office today  Scottie Acosta will follow-up with Dr Katlyn Mckoy in 3 months or sooner should the need arise  Scottie Acosta is aware to call our office with any further questions or concerns  History of Present Illness   Reason for Visit:  Hospital follow-up  Chief Complaint: "'I feel great"  HPI: Kreri Cummings is a 61 y o  female who presents to the office today for hospital follow-up  Patient was hospitalized from 3/12-3/14 secondary to throat swelling  Patient reports that she had eaten shellfish the previous evening which she felt was under cook    Patient reports that she felt significant throat swelling which made it difficult for her to breathe  Patient was treated with Decadron and transition to prednisone S complete resolution of her symptoms  Upon hospitalization there was an incidental finding of elevated left hemidiaphragm  Patient did have a sniff test which confirmed suspected eft diaphragm paralysis  Given that patient has been completely asymptomatic throughout her life  Reports that she is very active which includes gym, running, and walking I do not feel that any intervention is required at this time  Patient is currently seeing an allergist to attempt to identify which specific allergen triggered her angioedema  From a pulmonary standpoint, patient will begin following with Dr Alexandra Farooq for her left paralyzed hemidiaphragm  Patient reports the lifelong history of no smoking  Patient has never been diagnosed with COPD or asthma  Patient is currently not maintained on any inhaled or oxygen therapies  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  Patient denies having any pets however is around the dog  Patient denies any symptoms of GERD, SUN, dysphagia, seasonal allergies, or postnasal drip  Patient denies any acute exposures to dust, mold, asbestos, or silica  Review of Systems   Constitutional: Negative for chills and fever  HENT: Negative for ear pain and sore throat  Eyes: Negative for pain and visual disturbance  Respiratory: Negative for apnea, cough, choking, chest tightness, shortness of breath, wheezing and stridor  Cardiovascular: Negative for chest pain and palpitations  Gastrointestinal: Negative for abdominal pain and vomiting  Genitourinary: Negative for dysuria and hematuria  Musculoskeletal: Negative for arthralgias and back pain  Skin: Negative for color change and rash  Neurological: Negative for dizziness, seizures, syncope and facial asymmetry     Psychiatric/Behavioral: Negative for agitation and behavioral problems  All other systems reviewed and are negative  Historical Information   Past Medical History:   Diagnosis Date    Anaphylaxis     Anxiety      History reviewed  No pertinent surgical history  History reviewed  No pertinent family history  Social History   Social History     Substance and Sexual Activity   Alcohol Use Yes    Comment: socially     Social History     Substance and Sexual Activity   Drug Use Not Currently     Social History     Tobacco Use   Smoking Status Never Smoker   Smokeless Tobacco Never Used     E-Cigarette/Vaping    E-Cigarette Use Never User      E-Cigarette/Vaping Substances       Meds/Allergies     Current Outpatient Medications:     atenolol (TENORMIN) 25 mg tablet, Take 25 mg by mouth daily, Disp: , Rfl:     atenolol (TENORMIN) 50 mg tablet, Take 0 5 tablets by mouth daily, Disp: , Rfl:     atenolol (TENORMIN) 50 mg tablet, , Disp: , Rfl:     LORazepam (ATIVAN) 1 mg tablet, Take 1 tablet (1 mg total) by mouth every 8 (eight) hours as needed for anxiety, Disp: , Rfl:     EPINEPHrine (EPIPEN) 0 3 mg/0 3 mL SOAJ, Inject 0 3 mL (0 3 mg total) into a muscle once for 1 dose, Disp: 4 each, Rfl: 3  No Known Allergies    Vitals: Blood pressure 110/60, pulse 67, resp  rate 16, height 5' 4" (1 626 m), weight 64 kg (141 lb), SpO2 99 %  Body mass index is 24 2 kg/m²  Oxygen Therapy  SpO2: 99 %    Physical Exam:  Physical Exam  Constitutional:       Appearance: Normal appearance  She is normal weight  HENT:      Head: Normocephalic and atraumatic  Nose: Nose normal  No congestion or rhinorrhea  Mouth/Throat:      Mouth: Mucous membranes are dry  Pharynx: Oropharynx is clear  No oropharyngeal exudate or posterior oropharyngeal erythema  Cardiovascular:      Rate and Rhythm: Normal rate and regular rhythm  Pulses: Normal pulses  Heart sounds: Normal heart sounds  No murmur heard  No friction rub  No gallop      Pulmonary:      Effort: Pulmonary effort is normal  No tachypnea, bradypnea, accessory muscle usage or respiratory distress  Breath sounds: Normal breath sounds  No stridor, decreased air movement or transmitted upper airway sounds  No decreased breath sounds, wheezing, rhonchi or rales  Comments: Clear breath sounds  Chest:      Chest wall: No tenderness  Abdominal:      General: Abdomen is flat  Bowel sounds are normal  There is no distension  Palpations: Abdomen is soft  There is no mass  Musculoskeletal:         General: No swelling or tenderness  Normal range of motion  Cervical back: Normal range of motion and neck supple  No rigidity or tenderness  Skin:     General: Skin is warm and dry  Coloration: Skin is not jaundiced or pale  Neurological:      General: No focal deficit present  Mental Status: She is alert and oriented to person, place, and time  Mental status is at baseline  Psychiatric:         Mood and Affect: Mood normal          Behavior: Behavior normal            Imaging and other studies: I have personally reviewed pertinent films in PACS     Sniff test 4/12/2022- findings are concerning for left diaphragmatic paralysis    Pulmonary Results (PFTs, PSG): I have personally reviewed pertinent films in PACS     No PFTs recorded        JAY Zafar  [facility]        Portions of the record may have been created with voice recognition software  Occasional wrong word or "sound a like" substitutions may have occurred due to the inherent limitations of voice recognition software  Read the chart carefully and recognize, using context, where substitutions have occurred or contact the dictating provider

## 2022-04-20 NOTE — ASSESSMENT & PLAN NOTE
-  noted elevated left hemidiaphragm  -  sniff test was suggestive of possible paralyzed left hemidiaphragm  -  given that patient is completely asymptomatic throughout her life:  No limitations in her running, gym activities, daily errands do not feel any intervention is required at this time  -  patient will follow-up with us on an as-needed basis  -  any change whatsoever in her respiratory status she will notify pulmonary office

## 2022-04-20 NOTE — ASSESSMENT & PLAN NOTE
-  S/p after consuming suspected undercooked shellfish  -  proximally 6-8 hours after consumption patient experienced increased throat swelling and shortness of breath  -  symptoms resolved with administration of Decadron with prednisone taper  -  currently working closely with allergist for exact determination of provoking Agent  -  patient currently prescribed epinephrine present

## 2023-08-06 ENCOUNTER — HOSPITAL ENCOUNTER (EMERGENCY)
Facility: HOSPITAL | Age: 58
Discharge: HOME | End: 2023-08-06
Attending: EMERGENCY MEDICINE | Admitting: EMERGENCY MEDICINE

## 2023-08-06 VITALS
TEMPERATURE: 97.9 F | WEIGHT: 141.09 LBS | DIASTOLIC BLOOD PRESSURE: 84 MMHG | SYSTOLIC BLOOD PRESSURE: 126 MMHG | HEIGHT: 64 IN | BODY MASS INDEX: 24.09 KG/M2 | OXYGEN SATURATION: 98 % | RESPIRATION RATE: 16 BRPM | HEART RATE: 87 BPM

## 2023-08-06 DIAGNOSIS — F10.929 ALCOHOL INTOXICATION (HCC): Primary | ICD-10-CM

## 2023-08-06 PROCEDURE — NC001 PR NO CHARGE: Performed by: EMERGENCY MEDICINE

## 2023-08-06 PROCEDURE — 99284 EMERGENCY DEPT VISIT MOD MDM: CPT | Performed by: EMERGENCY MEDICINE

## 2023-08-06 PROCEDURE — 99284 EMERGENCY DEPT VISIT MOD MDM: CPT

## 2023-08-06 NOTE — ED PROVIDER NOTES
History  Chief Complaint   Patient presents with   • Alcohol Intoxication     Pt presents to ED via EMS after heavy etoh today. This is a 50-year-old female brought in by EMS. As per EMS the patient received bad news today and became intoxicated. Police were called to the house as well check and found a pot of boiling water on the stove. They were concerned for her wellbeing. The patient states that her  was diagnosed with a lung mass today and that she drank approximately 5 drinks. She denies SI HI. She denies chest pain or trouble breathing. She denies nausea or vomiting. She denies fevers or chills. Prior to Admission Medications   Prescriptions Last Dose Informant Patient Reported? Taking? EPINEPHrine (EPIPEN) 0.3 mg/0.3 mL SOAJ   No No   Sig: Inject 0.3 mL (0.3 mg total) into a muscle once for 1 dose   LORazepam (ATIVAN) 1 mg tablet   No No   Sig: Take 1 tablet (1 mg total) by mouth every 8 (eight) hours as needed for anxiety   atenolol (TENORMIN) 25 mg tablet   Yes No   Sig: Take 25 mg by mouth daily   atenolol (TENORMIN) 50 mg tablet   Yes No   Sig: Take 0.5 tablets by mouth daily   atenolol (TENORMIN) 50 mg tablet   Yes No      Facility-Administered Medications: None       Past Medical History:   Diagnosis Date   • Anaphylaxis    • Anxiety        History reviewed. No pertinent surgical history. History reviewed. No pertinent family history. I have reviewed and agree with the history as documented. E-Cigarette/Vaping   • E-Cigarette Use Never User      E-Cigarette/Vaping Substances     Social History     Tobacco Use   • Smoking status: Never   • Smokeless tobacco: Never   Vaping Use   • Vaping Use: Never used   Substance Use Topics   • Alcohol use: Yes     Comment: socially   • Drug use: Not Currently       Review of Systems   All other systems reviewed and are negative.       Physical Exam  Physical Exam  Constitutional:  Vital signs reviewed, patient appears nontoxic, no acute distress, appears mildly intoxicated  Eyes: Pupils equal round reactive to light and accommodation, extraocular muscles intact  HEENT: trachea midline, no JVD, moist mucous membranes  Respiratory: lung sounds clear throughout, no rhonchi, no rales  Cardiovascular: regular rate rhythm, no murmurs or rubs  Abdomen: soft, nontender, nondistended, no rebound or guarding  Back: no CVA tenderness, normal inspection  Extremities: no edema, pulses equal in all 4 extremities  Neuro: awake, alert, oriented, no focal weakness  Skin: warm, dry, intact, no rashes noted    Vital Signs  ED Triage Vitals [08/06/23 1552]   Temperature Pulse Respirations Blood Pressure SpO2   97.7 °F (36.5 °C) (!) 106 16 167/92 95 %      Temp Source Heart Rate Source Patient Position - Orthostatic VS BP Location FiO2 (%)   Oral -- -- Right arm --      Pain Score       --           Vitals:    08/06/23 1552   BP: 167/92   Pulse: (!) 106         Visual Acuity      ED Medications  Medications - No data to display    Diagnostic Studies  Results Reviewed     None                 No orders to display              Procedures  Procedures         ED Course  ED Course as of 08/06/23 1630   Sun Aug 06, 2023   1629 I spoke with the patient's daughter. She does state that the patient called her and appeared intoxicated. She denies other complaints. She  is aware that the patient's is in the hospital and is safe. Medical Decision Making  This is a 26-year-old female who presents to the emergency department for intoxication. I considered alcohol intoxication, depression. These and other diagnoses were considered. Disposition  Final diagnoses:   None     ED Disposition     None      Follow-up Information    None         Patient's Medications   Discharge Prescriptions    No medications on file       No discharge procedures on file.     PDMP Review       Value Time User    PDMP Reviewed  Yes 3/12/2022  6:42 Syeda Stoddard MD          ED Provider  Electronically Signed by           Jerrod Genao DO  08/07/23 1111

## 2023-08-07 NOTE — ED NOTES
Pt is requesting to be discharged, Dr. Cata Maria aware.      George Harrison, CELINE  08/06/23 3202

## 2023-12-22 ENCOUNTER — ANESTHESIA (INPATIENT)
Dept: PERIOP | Facility: HOSPITAL | Age: 58
DRG: 329 | End: 2023-12-22
Payer: COMMERCIAL

## 2023-12-22 ENCOUNTER — HOSPITAL ENCOUNTER (INPATIENT)
Facility: HOSPITAL | Age: 58
LOS: 2 days | Discharge: HOME | DRG: 329 | End: 2023-12-24
Attending: EMERGENCY MEDICINE | Admitting: SURGERY
Payer: COMMERCIAL

## 2023-12-22 ENCOUNTER — APPOINTMENT (EMERGENCY)
Dept: CT IMAGING | Facility: HOSPITAL | Age: 58
DRG: 329 | End: 2023-12-22
Payer: COMMERCIAL

## 2023-12-22 ENCOUNTER — ANESTHESIA EVENT (INPATIENT)
Dept: PERIOP | Facility: HOSPITAL | Age: 58
DRG: 329 | End: 2023-12-22
Payer: COMMERCIAL

## 2023-12-22 DIAGNOSIS — K41.30: Primary | ICD-10-CM

## 2023-12-22 LAB
ALBUMIN SERPL BCP-MCNC: 4.6 G/DL (ref 3.5–5)
ALP SERPL-CCNC: 89 U/L (ref 34–104)
ALT SERPL W P-5'-P-CCNC: 19 U/L (ref 7–52)
AMORPH URATE CRY URNS QL MICRO: ABNORMAL
ANION GAP SERPL CALCULATED.3IONS-SCNC: 10 MMOL/L
AST SERPL W P-5'-P-CCNC: 20 U/L (ref 13–39)
BACTERIA UR QL AUTO: ABNORMAL /HPF
BASOPHILS # BLD MANUAL: 0.12 THOUSAND/UL (ref 0–0.1)
BASOPHILS NFR MAR MANUAL: 1 % (ref 0–1)
BILIRUB SERPL-MCNC: 0.48 MG/DL (ref 0.2–1)
BILIRUB UR QL STRIP: NEGATIVE
BUN SERPL-MCNC: 17 MG/DL (ref 5–25)
CALCIUM SERPL-MCNC: 9.6 MG/DL (ref 8.4–10.2)
CHLORIDE SERPL-SCNC: 104 MMOL/L (ref 96–108)
CLARITY UR: ABNORMAL
CO2 SERPL-SCNC: 25 MMOL/L (ref 21–32)
COLOR UR: YELLOW
CREAT SERPL-MCNC: 0.74 MG/DL (ref 0.6–1.3)
EOSINOPHIL # BLD MANUAL: 0 THOUSAND/UL (ref 0–0.4)
EOSINOPHIL NFR BLD MANUAL: 0 % (ref 0–6)
ERYTHROCYTE [DISTWIDTH] IN BLOOD BY AUTOMATED COUNT: 13.5 % (ref 11.6–15.1)
GFR SERPL CREATININE-BSD FRML MDRD: 88 ML/MIN/1.73SQ M
GLUCOSE SERPL-MCNC: 115 MG/DL (ref 65–140)
GLUCOSE UR STRIP-MCNC: NEGATIVE MG/DL
HCT VFR BLD AUTO: 40.7 % (ref 34.8–46.1)
HGB BLD-MCNC: 13.3 G/DL (ref 11.5–15.4)
HGB UR QL STRIP.AUTO: NEGATIVE
HYALINE CASTS #/AREA URNS LPF: ABNORMAL /LPF
KETONES UR STRIP-MCNC: NEGATIVE MG/DL
LACTATE SERPL-SCNC: 1.1 MMOL/L (ref 0.5–2)
LEUKOCYTE ESTERASE UR QL STRIP: NEGATIVE
LG PLATELETS BLD QL SMEAR: PRESENT
LYMPHOCYTES # BLD AUTO: 0.59 THOUSAND/UL (ref 0.6–4.47)
LYMPHOCYTES # BLD AUTO: 5 % (ref 14–44)
MCH RBC QN AUTO: 30.1 PG (ref 26.8–34.3)
MCHC RBC AUTO-ENTMCNC: 32.7 G/DL (ref 31.4–37.4)
MCV RBC AUTO: 92 FL (ref 82–98)
MONOCYTES # BLD AUTO: 0.24 THOUSAND/UL (ref 0–1.22)
MONOCYTES NFR BLD: 2 % (ref 4–12)
MUCOUS THREADS UR QL AUTO: ABNORMAL
NEUTROPHILS # BLD MANUAL: 10.94 THOUSAND/UL (ref 1.85–7.62)
NEUTS BAND NFR BLD MANUAL: 3 % (ref 0–8)
NEUTS SEG NFR BLD AUTO: 89 % (ref 43–75)
NITRITE UR QL STRIP: NEGATIVE
NON-SQ EPI CELLS URNS QL MICRO: ABNORMAL /HPF
PH UR STRIP.AUTO: 8 [PH]
PLATELET # BLD AUTO: 407 THOUSANDS/UL (ref 149–390)
PLATELET BLD QL SMEAR: ABNORMAL
PMV BLD AUTO: 9.4 FL (ref 8.9–12.7)
POTASSIUM SERPL-SCNC: 3.9 MMOL/L (ref 3.5–5.3)
PROT SERPL-MCNC: 7.7 G/DL (ref 6.4–8.4)
PROT UR STRIP-MCNC: ABNORMAL MG/DL
RBC # BLD AUTO: 4.42 MILLION/UL (ref 3.81–5.12)
RBC #/AREA URNS AUTO: ABNORMAL /HPF
RBC MORPH BLD: NORMAL
SODIUM SERPL-SCNC: 139 MMOL/L (ref 135–147)
SP GR UR STRIP.AUTO: 1.02 (ref 1–1.03)
UROBILINOGEN UR STRIP-ACNC: <2 MG/DL
WBC # BLD AUTO: 11.89 THOUSAND/UL (ref 4.31–10.16)
WBC #/AREA URNS AUTO: ABNORMAL /HPF

## 2023-12-22 PROCEDURE — 99284 EMERGENCY DEPT VISIT MOD MDM: CPT

## 2023-12-22 PROCEDURE — 44202 LAP ENTERECTOMY: CPT | Performed by: SURGERY

## 2023-12-22 PROCEDURE — 85027 COMPLETE CBC AUTOMATED: CPT

## 2023-12-22 PROCEDURE — 0DB80ZZ EXCISION OF SMALL INTESTINE, OPEN APPROACH: ICD-10-PCS | Performed by: SURGERY

## 2023-12-22 PROCEDURE — G1004 CDSM NDSC: HCPCS

## 2023-12-22 PROCEDURE — 99223 1ST HOSP IP/OBS HIGH 75: CPT | Performed by: SURGERY

## 2023-12-22 PROCEDURE — 96374 THER/PROPH/DIAG INJ IV PUSH: CPT

## 2023-12-22 PROCEDURE — 81001 URINALYSIS AUTO W/SCOPE: CPT

## 2023-12-22 PROCEDURE — 80053 COMPREHEN METABOLIC PANEL: CPT

## 2023-12-22 PROCEDURE — 85007 BL SMEAR W/DIFF WBC COUNT: CPT

## 2023-12-22 PROCEDURE — 36415 COLL VENOUS BLD VENIPUNCTURE: CPT

## 2023-12-22 PROCEDURE — 88307 TISSUE EXAM BY PATHOLOGIST: CPT | Performed by: PATHOLOGY

## 2023-12-22 PROCEDURE — 83605 ASSAY OF LACTIC ACID: CPT

## 2023-12-22 PROCEDURE — 99285 EMERGENCY DEPT VISIT HI MDM: CPT | Performed by: EMERGENCY MEDICINE

## 2023-12-22 PROCEDURE — 0YQ70ZZ REPAIR RIGHT FEMORAL REGION, OPEN APPROACH: ICD-10-PCS | Performed by: SURGERY

## 2023-12-22 PROCEDURE — 74177 CT ABD & PELVIS W/CONTRAST: CPT

## 2023-12-22 RX ORDER — SODIUM CHLORIDE, SODIUM LACTATE, POTASSIUM CHLORIDE, CALCIUM CHLORIDE 600; 310; 30; 20 MG/100ML; MG/100ML; MG/100ML; MG/100ML
50 INJECTION, SOLUTION INTRAVENOUS CONTINUOUS
Status: DISCONTINUED | OUTPATIENT
Start: 2023-12-22 | End: 2023-12-24 | Stop reason: HOSPADM

## 2023-12-22 RX ORDER — FENTANYL CITRATE 50 UG/ML
INJECTION, SOLUTION INTRAMUSCULAR; INTRAVENOUS AS NEEDED
Status: DISCONTINUED | OUTPATIENT
Start: 2023-12-22 | End: 2023-12-22

## 2023-12-22 RX ORDER — MAGNESIUM HYDROXIDE 1200 MG/15ML
LIQUID ORAL AS NEEDED
Status: DISCONTINUED | OUTPATIENT
Start: 2023-12-22 | End: 2023-12-22 | Stop reason: HOSPADM

## 2023-12-22 RX ORDER — LIDOCAINE HYDROCHLORIDE 10 MG/ML
INJECTION, SOLUTION EPIDURAL; INFILTRATION; INTRACAUDAL; PERINEURAL AS NEEDED
Status: DISCONTINUED | OUTPATIENT
Start: 2023-12-22 | End: 2023-12-22

## 2023-12-22 RX ORDER — SODIUM CHLORIDE, SODIUM LACTATE, POTASSIUM CHLORIDE, CALCIUM CHLORIDE 600; 310; 30; 20 MG/100ML; MG/100ML; MG/100ML; MG/100ML
75 INJECTION, SOLUTION INTRAVENOUS CONTINUOUS
Status: CANCELLED | OUTPATIENT
Start: 2023-12-22

## 2023-12-22 RX ORDER — LORAZEPAM 1 MG/1
1 TABLET ORAL EVERY 8 HOURS PRN
Status: DISCONTINUED | OUTPATIENT
Start: 2023-12-22 | End: 2023-12-24 | Stop reason: HOSPADM

## 2023-12-22 RX ORDER — ONDANSETRON 2 MG/ML
INJECTION INTRAMUSCULAR; INTRAVENOUS AS NEEDED
Status: DISCONTINUED | OUTPATIENT
Start: 2023-12-22 | End: 2023-12-22

## 2023-12-22 RX ORDER — HYDROMORPHONE HCL/PF 1 MG/ML
0.5 SYRINGE (ML) INJECTION
Status: DISCONTINUED | OUTPATIENT
Start: 2023-12-22 | End: 2023-12-24 | Stop reason: HOSPADM

## 2023-12-22 RX ORDER — FENTANYL CITRATE/PF 50 MCG/ML
50 SYRINGE (ML) INJECTION
Status: DISCONTINUED | OUTPATIENT
Start: 2023-12-22 | End: 2023-12-22 | Stop reason: HOSPADM

## 2023-12-22 RX ORDER — KETOROLAC TROMETHAMINE 30 MG/ML
INJECTION, SOLUTION INTRAMUSCULAR; INTRAVENOUS AS NEEDED
Status: DISCONTINUED | OUTPATIENT
Start: 2023-12-22 | End: 2023-12-22

## 2023-12-22 RX ORDER — PROPOFOL 10 MG/ML
INJECTION, EMULSION INTRAVENOUS AS NEEDED
Status: DISCONTINUED | OUTPATIENT
Start: 2023-12-22 | End: 2023-12-22

## 2023-12-22 RX ORDER — HYDROMORPHONE HCL IN WATER/PF 6 MG/30 ML
0.2 PATIENT CONTROLLED ANALGESIA SYRINGE INTRAVENOUS
Status: DISCONTINUED | OUTPATIENT
Start: 2023-12-22 | End: 2023-12-22 | Stop reason: HOSPADM

## 2023-12-22 RX ORDER — ONDANSETRON 2 MG/ML
4 INJECTION INTRAMUSCULAR; INTRAVENOUS EVERY 6 HOURS PRN
Status: DISCONTINUED | OUTPATIENT
Start: 2023-12-22 | End: 2023-12-24 | Stop reason: HOSPADM

## 2023-12-22 RX ORDER — MEPERIDINE HYDROCHLORIDE 25 MG/ML
12.5 INJECTION INTRAMUSCULAR; INTRAVENOUS; SUBCUTANEOUS
Status: DISCONTINUED | OUTPATIENT
Start: 2023-12-22 | End: 2023-12-22 | Stop reason: HOSPADM

## 2023-12-22 RX ORDER — MIDAZOLAM HYDROCHLORIDE 2 MG/2ML
INJECTION, SOLUTION INTRAMUSCULAR; INTRAVENOUS AS NEEDED
Status: DISCONTINUED | OUTPATIENT
Start: 2023-12-22 | End: 2023-12-22

## 2023-12-22 RX ORDER — HYDROMORPHONE HCL/PF 1 MG/ML
SYRINGE (ML) INJECTION AS NEEDED
Status: DISCONTINUED | OUTPATIENT
Start: 2023-12-22 | End: 2023-12-22

## 2023-12-22 RX ORDER — PHENYLEPHRINE HCL IN 0.9% NACL 1 MG/10 ML
SYRINGE (ML) INTRAVENOUS AS NEEDED
Status: DISCONTINUED | OUTPATIENT
Start: 2023-12-22 | End: 2023-12-22

## 2023-12-22 RX ORDER — ONDANSETRON 2 MG/ML
4 INJECTION INTRAMUSCULAR; INTRAVENOUS ONCE AS NEEDED
Status: DISCONTINUED | OUTPATIENT
Start: 2023-12-22 | End: 2023-12-22 | Stop reason: HOSPADM

## 2023-12-22 RX ORDER — SCOLOPAMINE TRANSDERMAL SYSTEM 1 MG/1
PATCH, EXTENDED RELEASE TRANSDERMAL
Status: COMPLETED
Start: 2023-12-22 | End: 2023-12-22

## 2023-12-22 RX ORDER — METRONIDAZOLE 500 MG/100ML
500 INJECTION, SOLUTION INTRAVENOUS EVERY 8 HOURS
Status: DISCONTINUED | OUTPATIENT
Start: 2023-12-22 | End: 2023-12-24 | Stop reason: HOSPADM

## 2023-12-22 RX ORDER — SCOLOPAMINE TRANSDERMAL SYSTEM 1 MG/1
1 PATCH, EXTENDED RELEASE TRANSDERMAL
Status: DISCONTINUED | OUTPATIENT
Start: 2023-12-22 | End: 2023-12-24 | Stop reason: HOSPADM

## 2023-12-22 RX ORDER — ATENOLOL 25 MG/1
25 TABLET ORAL DAILY
Status: DISCONTINUED | OUTPATIENT
Start: 2023-12-23 | End: 2023-12-24 | Stop reason: HOSPADM

## 2023-12-22 RX ORDER — HYDROMORPHONE HCL/PF 1 MG/ML
0.5 SYRINGE (ML) INJECTION ONCE
Status: COMPLETED | OUTPATIENT
Start: 2023-12-22 | End: 2023-12-22

## 2023-12-22 RX ORDER — CEFAZOLIN SODIUM 2 G/50ML
SOLUTION INTRAVENOUS AS NEEDED
Status: DISCONTINUED | OUTPATIENT
Start: 2023-12-22 | End: 2023-12-22

## 2023-12-22 RX ORDER — OXYCODONE HYDROCHLORIDE 5 MG/1
5 TABLET ORAL EVERY 4 HOURS PRN
Status: DISCONTINUED | OUTPATIENT
Start: 2023-12-22 | End: 2023-12-24 | Stop reason: HOSPADM

## 2023-12-22 RX ORDER — SUCCINYLCHOLINE/SOD CL,ISO/PF 100 MG/5ML
SYRINGE (ML) INTRAVENOUS AS NEEDED
Status: DISCONTINUED | OUTPATIENT
Start: 2023-12-22 | End: 2023-12-22

## 2023-12-22 RX ORDER — OXYCODONE HYDROCHLORIDE 5 MG/1
5 TABLET ORAL EVERY 4 HOURS PRN
Qty: 10 TABLET | Refills: 0 | Status: SHIPPED | OUTPATIENT
Start: 2023-12-22 | End: 2024-01-01

## 2023-12-22 RX ORDER — DEXAMETHASONE SODIUM PHOSPHATE 10 MG/ML
INJECTION, SOLUTION INTRAMUSCULAR; INTRAVENOUS AS NEEDED
Status: DISCONTINUED | OUTPATIENT
Start: 2023-12-22 | End: 2023-12-22

## 2023-12-22 RX ORDER — ROCURONIUM BROMIDE 10 MG/ML
INJECTION, SOLUTION INTRAVENOUS AS NEEDED
Status: DISCONTINUED | OUTPATIENT
Start: 2023-12-22 | End: 2023-12-22

## 2023-12-22 RX ORDER — HEPARIN SODIUM 5000 [USP'U]/ML
5000 INJECTION, SOLUTION INTRAVENOUS; SUBCUTANEOUS EVERY 8 HOURS SCHEDULED
Status: DISCONTINUED | OUTPATIENT
Start: 2023-12-22 | End: 2023-12-24 | Stop reason: HOSPADM

## 2023-12-22 RX ORDER — SODIUM CHLORIDE, SODIUM LACTATE, POTASSIUM CHLORIDE, CALCIUM CHLORIDE 600; 310; 30; 20 MG/100ML; MG/100ML; MG/100ML; MG/100ML
INJECTION, SOLUTION INTRAVENOUS CONTINUOUS PRN
Status: DISCONTINUED | OUTPATIENT
Start: 2023-12-22 | End: 2023-12-22

## 2023-12-22 RX ORDER — OXYCODONE HYDROCHLORIDE 10 MG/1
10 TABLET ORAL EVERY 4 HOURS PRN
Status: DISCONTINUED | OUTPATIENT
Start: 2023-12-22 | End: 2023-12-24 | Stop reason: HOSPADM

## 2023-12-22 RX ORDER — BUPIVACAINE HYDROCHLORIDE 2.5 MG/ML
INJECTION, SOLUTION EPIDURAL; INFILTRATION; INTRACAUDAL AS NEEDED
Status: DISCONTINUED | OUTPATIENT
Start: 2023-12-22 | End: 2023-12-22 | Stop reason: HOSPADM

## 2023-12-22 RX ORDER — CEFAZOLIN SODIUM 2 G/50ML
2000 SOLUTION INTRAVENOUS EVERY 8 HOURS
Status: DISCONTINUED | OUTPATIENT
Start: 2023-12-22 | End: 2023-12-24 | Stop reason: HOSPADM

## 2023-12-22 RX ADMIN — HEPARIN SODIUM 5000 UNITS: 5000 INJECTION INTRAVENOUS; SUBCUTANEOUS at 20:16

## 2023-12-22 RX ADMIN — FENTANYL CITRATE 50 MCG: 50 INJECTION INTRAMUSCULAR; INTRAVENOUS at 17:51

## 2023-12-22 RX ADMIN — SODIUM CHLORIDE, SODIUM LACTATE, POTASSIUM CHLORIDE, AND CALCIUM CHLORIDE 100 ML/HR: .6; .31; .03; .02 INJECTION, SOLUTION INTRAVENOUS at 20:01

## 2023-12-22 RX ADMIN — Medication 60 MG: at 17:51

## 2023-12-22 RX ADMIN — MIDAZOLAM 2 MG: 1 INJECTION INTRAMUSCULAR; INTRAVENOUS at 17:47

## 2023-12-22 RX ADMIN — LIDOCAINE HYDROCHLORIDE 50 MG: 10 INJECTION, SOLUTION EPIDURAL; INFILTRATION; INTRACAUDAL; PERINEURAL at 17:51

## 2023-12-22 RX ADMIN — ONDANSETRON 4 MG: 2 INJECTION INTRAMUSCULAR; INTRAVENOUS at 17:51

## 2023-12-22 RX ADMIN — PROPOFOL 150 MG: 10 INJECTION, EMULSION INTRAVENOUS at 17:51

## 2023-12-22 RX ADMIN — DEXAMETHASONE SODIUM PHOSPHATE 10 MG: 10 INJECTION, SOLUTION INTRAMUSCULAR; INTRAVENOUS at 17:51

## 2023-12-22 RX ADMIN — KETOROLAC TROMETHAMINE 15 MG: 30 INJECTION, SOLUTION INTRAMUSCULAR; INTRAVENOUS at 19:07

## 2023-12-22 RX ADMIN — HYDROMORPHONE HYDROCHLORIDE 0.5 MG: 1 INJECTION, SOLUTION INTRAMUSCULAR; INTRAVENOUS; SUBCUTANEOUS at 17:11

## 2023-12-22 RX ADMIN — HYDROMORPHONE HYDROCHLORIDE 0.5 MG: 1 INJECTION, SOLUTION INTRAMUSCULAR; INTRAVENOUS; SUBCUTANEOUS at 18:23

## 2023-12-22 RX ADMIN — SODIUM CHLORIDE, SODIUM LACTATE, POTASSIUM CHLORIDE, AND CALCIUM CHLORIDE: .6; .31; .03; .02 INJECTION, SOLUTION INTRAVENOUS at 17:49

## 2023-12-22 RX ADMIN — IOHEXOL 80 ML: 350 INJECTION, SOLUTION INTRAVENOUS at 16:21

## 2023-12-22 RX ADMIN — SUGAMMADEX 200 MG: 100 INJECTION, SOLUTION INTRAVENOUS at 18:58

## 2023-12-22 RX ADMIN — METRONIDAZOLE: 500 INJECTION, SOLUTION INTRAVENOUS at 18:15

## 2023-12-22 RX ADMIN — FENTANYL CITRATE 50 MCG: 50 INJECTION INTRAMUSCULAR; INTRAVENOUS at 18:18

## 2023-12-22 RX ADMIN — SCOPOLAMINE 1 PATCH: 1.5 PATCH, EXTENDED RELEASE TRANSDERMAL at 18:41

## 2023-12-22 RX ADMIN — ROCURONIUM BROMIDE 20 MG: 10 INJECTION, SOLUTION INTRAVENOUS at 18:05

## 2023-12-22 RX ADMIN — CEFAZOLIN SODIUM 2000 MG: 2 SOLUTION INTRAVENOUS at 17:50

## 2023-12-22 RX ADMIN — Medication 100 MCG: at 18:02

## 2023-12-22 RX ADMIN — CEFAZOLIN SODIUM 2000 MG: 2 SOLUTION INTRAVENOUS at 20:16

## 2023-12-22 NOTE — DISCHARGE INSTR - AVS FIRST PAGE
Please call the office when you leave to schedule an appointment to be seen in 2-3 weeks    Activity:    Do not lift more than 25 pounds for 4 weeks post-operatively                 Walking is encouraged  Normal daily activities including climbing steps are okay  Do not engage in strenuous activity or contact sports for 4-6 weeks post-operatively    Return to work:   Return to work to be discussed at first post-operative visit    Diet:   You may return to your normal heart healthy diet    Wound Care:  Your wound is closed with skin glue  It is okay to shower. Wash incision gently with soap and water and pat dry. Do not soak incisions in bath water or swim for two weeks. Do not apply any creams or ointments.  Apply ice as needed to wound    Pain Medication:   Please take as directed  No driving while taking narcotic pain medications    Other:  If you have questions after discharge please call the office.    If you have increased pain, fever >101.5, increased drainage, redness or a bad smell at your surgery site, please call us immediately or come directly to the Emergency Room

## 2023-12-22 NOTE — H&P
General Surgery  History and Physical  Verónica Montiel 58 y.o. female MRN: [id number]  Unit/Bed#: ED-38 Encounter: [id number]    Assessment:  59yo F with right femoral hernia containing bowel, concerning for closed loop obstruction within the hernia    AVSS on RA  WBC 11.89  Hgb 13.3  Creatinine 0.74    12/22 CT AP: Right femoral hernia with evolving closed-loop obstruction The hernia sac contains small bowel loop and fluid No pneumatosis seen      Plan:  - admit to general surgery service  - NPO for OR  - emergent OR for right femoral hernia repair, possible SBR, possible ex lap  - start ancef/flagyl  - pain control  - further recs to follow pending operative findings      History of Present Illness   HPI:  Verónica Montiel is a 58 y.o. female who presents with right groin pain. This started after she had a viral illness where she was vomiting and retching frequently. Today she started having pain in her right groin and noticed a larger bulge that would not reduce. She denies vomiting. Had a BM today and passed flatus, but not since the pain started.     No known PMHx, no PSHx    Review of Systems   Constitutional: Negative.    HENT: Negative.     Respiratory: Negative.     Cardiovascular: Negative.    Genitourinary: Negative.    Musculoskeletal: Negative.    Skin: Negative.    Neurological: Negative.    Psychiatric/Behavioral: Negative.         Historical Information   Past Medical History:   Diagnosis Date    Anaphylaxis     Anxiety      History reviewed. No pertinent surgical history.  Social History   Social History     Substance and Sexual Activity   Alcohol Use Yes    Comment: socially     Social History     Substance and Sexual Activity   Drug Use Not Currently     Social History     Tobacco Use   Smoking Status Never   Smokeless Tobacco Never     Family History: non-contributory    Meds/Allergies   all medications and allergies reviewed  Allergies   Allergen Reactions    Crab (Diagnostic) - Food Allergy  "Anaphylaxis       Objective   First Vitals:   Blood Pressure: 148/66 (12/22/23 1147)  Pulse: 58 (12/22/23 1147)  Temperature: 97.6 °F (36.4 °C) (12/22/23 1147)  Temp Source: Oral (12/22/23 1147)  Respirations: 16 (12/22/23 1147)  Height: 5' 4\" (162.6 cm) (12/22/23 1147)  Weight - Scale: 58.5 kg (129 lb) (12/22/23 1147)  SpO2: 100 % (12/22/23 1147)    Current Vitals:   Blood Pressure: 116/67 (12/22/23 1600)  Pulse: 69 (12/22/23 1600)  Temperature: 97.6 °F (36.4 °C) (12/22/23 1147)  Temp Source: Oral (12/22/23 1147)  Respirations: 18 (12/22/23 1600)  Height: 5' 4\" (162.6 cm) (12/22/23 1147)  Weight - Scale: 58.5 kg (129 lb) (12/22/23 1147)  SpO2: 100 % (12/22/23 1600)    No intake or output data in the 24 hours ending 12/22/23 1707    Invasive Devices       Peripheral Intravenous Line  Duration             Peripheral IV 12/22/23 Proximal;Right;Ventral (anterior) Forearm <1 day                    Physical Exam:  General: No acute distress  Neuro: alert and oriented  HEENT: moist mucous membranes  CV: Well perfused, regular rate and rhythm  Lungs: Normal work of breathing, no increased respiratory effort  Abdomen: Soft, non-tender, non-distended.   Groin: right groin with soft mass that is tender, not reducible, no overlying skin changes  Extremities: No edema, clubbing or cyanosis  Skin: Warm, dry    Lab Results: CBC:   Lab Results   Component Value Date    WBC 11.89 (H) 12/22/2023    HGB 13.3 12/22/2023    HCT 40.7 12/22/2023    MCV 92 12/22/2023     (H) 12/22/2023    RBC 4.42 12/22/2023    MCH 30.1 12/22/2023    MCHC 32.7 12/22/2023    RDW 13.5 12/22/2023    MPV 9.4 12/22/2023   , CMP:   Lab Results   Component Value Date    SODIUM 139 12/22/2023    K 3.9 12/22/2023     12/22/2023    CO2 25 12/22/2023    BUN 17 12/22/2023    CREATININE 0.74 12/22/2023    CALCIUM 9.6 12/22/2023    AST 20 12/22/2023    ALT 19 12/22/2023    ALKPHOS 89 12/22/2023    EGFR 88 12/22/2023     Imaging: I have personally reviewed " pertinent films in PACS  EKG, Pathology, and Other Studies: I have personally reviewed pertinent films in PACS    Code Status: Prior  Advance Directive and Living Will:      Power of :    POLST:      Counseling / Coordination of Care  Total floor / unit time spent today 20 minutes. This involved direct patient contact where I performed a full history and physical, reviewed previous records, and reviewed laboratory and other diagnostic studies. Greater than 50% of total time was spent with the patient and / or family counseling and / or coordination of care.    No Juan MD  General Surgery PGY2  12/22/2023

## 2023-12-22 NOTE — LETTER
[facility] FLOOR MED SURG UNIT  [address]  [place] [address]  Dept: [phone]    December 24, 2023     Patient: Verónica Montiel   YOB: 1965   Date of Visit: 12/22/2023       To Whom it May Concern:    Verónica Montiel is under my professional care. She was seen in the hospital from 12/22/2023 to 12/24/23. She may return to work on January 3, 2024 with the following limitations - no lifting more than 15 pounds for 2 weeks .    If you have any questions or concerns, please don't hesitate to call.         Sincerely,          Keyanna Juan MD

## 2023-12-22 NOTE — ANESTHESIA PREPROCEDURE EVALUATION
Procedure:  REPAIR HERNIA FEMORAL, possible small bowel resection, possibl exploratory laparotomy (Groin)    Relevant Problems   ANESTHESIA (within normal limits)      CARDIO (within normal limits)      ENDO (within normal limits)      GI/HEPATIC   (+) Acid reflux      /RENAL (within normal limits)      GYN (within normal limits)      HEMATOLOGY (within normal limits)      MUSCULOSKELETAL (within normal limits)      NEURO/PSYCH   (+) Anxiety state      PULMONARY (within normal limits)      Lab Results   Component Value Date    WBC 11.89 (H) 12/22/2023    HGB 13.3 12/22/2023    HCT 40.7 12/22/2023    MCV 92 12/22/2023     (H) 12/22/2023     Lab Results   Component Value Date    SODIUM 139 12/22/2023    K 3.9 12/22/2023     12/22/2023    CO2 25 12/22/2023    AGAP 10 12/22/2023    BUN 17 12/22/2023    CREATININE 0.74 12/22/2023    GLUC 115 12/22/2023    CALCIUM 9.6 12/22/2023    AST 20 12/22/2023    ALT 19 12/22/2023    ALKPHOS 89 12/22/2023    TP 7.7 12/22/2023    TBILI 0.48 12/22/2023    EGFR 88 12/22/2023     Lab Results   Component Value Date    PTT 27 03/12/2022     Lab Results   Component Value Date    INR 0.90 03/12/2022    PROTIME 12.2 03/12/2022         Physical Exam    Airway    Mallampati score: II  TM Distance: >3 FB  Neck ROM: full     Dental   No notable dental hx     Cardiovascular  Rhythm: regular, Rate: normal, Cardiovascular exam normal    Pulmonary  Pulmonary exam normal Breath sounds clear to auscultation    Other Findings  post-pubertal.      Anesthesia Plan  ASA Score- 2 Emergent    Anesthesia Type- general with ASA Monitors.         Additional Monitors:     Airway Plan: ETT.           Plan Factors-Exercise tolerance (METS): >4 METS.    Chart reviewed. EKG reviewed. Imaging results reviewed. Existing labs reviewed. Patient summary reviewed.    Patient is not a current smoker.  Patient did not smoke on day of surgery.    Obstructive sleep apnea risk education given  perioperatively.        Induction- intravenous.    Postoperative Plan- Plan for postoperative opioid use.     Informed Consent- Anesthetic plan and risks discussed with patient.  I personally reviewed this patient with the CRNA. Discussed and agreed on the Anesthesia Plan with the CRNA..

## 2023-12-22 NOTE — ED PROVIDER NOTES
"History  Chief Complaint   Patient presents with    Abdominal Pain     Patient presents to ED with right lower quadrant abdominal pain that started this morning with a distention present on right side. Covid a week ago.      Verónica Montiel is a 58 y.o. female with history of a chronic \"cyst\" and anxiety presenting for sudden onset abdominal pain.    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  Pain resulted in her having to double over in pain, she reports feeling chilled, sweaty, and her daughter at bedside reports that she also appeared pale/green.  Pain lasted for a few minutes, and concerned she decided to present to the ED.  Since arriving in the ED she reports the pain is no longer present, only mild tenderness on deep palpation of the suprapubic regions.     Patient has a chronic \"cyst\" in her RLQ that she reports she's had for years, reports it had been evaluated a long time ago and was told then that it was nothing to be concerned about. Reports it is now bigger since this morning and tense when she pushes on it since. Denies heavy lifting today, denies trauma. Reports she did have a bowel movement and did pass flatus. Normal appetite the last few days. Reports having COVID last week, however has no residual symptoms.  Also reports GI symptoms in November which resulted in forceful vomiting and retching, however this nausea, vomiting and diarrhea was not associated with any pain.  Has not had symptoms for over a month at this point. Had not noticed specific enlargement of her \"cyst.\"    Reports normal sleep and normal appetite. Ambulating well at her baseline.    The following portions of the patient's history were reviewed and updated as appropriate: allergies, current medications, past family history, past medical history, past social " "history, past surgical history, and problem list.    History collected from patient and daughter, translation services not necessary.        Prior to Admission Medications   Prescriptions Last Dose Informant Patient Reported? Taking?   EPINEPHrine (EPIPEN) 0.3 mg/0.3 mL SOAJ   No No   Sig: Inject 0.3 mL (0.3 mg total) into a muscle once for 1 dose   LORazepam (ATIVAN) 1 mg tablet   No No   Sig: Take 1 tablet (1 mg total) by mouth every 8 (eight) hours as needed for anxiety   atenolol (TENORMIN) 25 mg tablet   Yes No   Sig: Take 25 mg by mouth daily   atenolol (TENORMIN) 50 mg tablet   Yes No   Sig: Take 0.5 tablets by mouth daily   atenolol (TENORMIN) 50 mg tablet   Yes No      Facility-Administered Medications: None       Past Medical History:   Diagnosis Date    Anaphylaxis     Anxiety        History reviewed. No pertinent surgical history.    History reviewed. No pertinent family history.  I have reviewed and agree with the history as documented.    E-Cigarette/Vaping    E-Cigarette Use Never User      E-Cigarette/Vaping Substances     Social History     Tobacco Use    Smoking status: Never    Smokeless tobacco: Never   Vaping Use    Vaping status: Never Used   Substance Use Topics    Alcohol use: Yes     Comment: socially    Drug use: Not Currently        Review of Systems   Constitutional:  Positive for chills and diaphoresis (\"Sweaty\"). Negative for fever.   HENT:  Negative for congestion, hearing loss and trouble swallowing.    Eyes:  Negative for pain and visual disturbance.   Respiratory:  Negative for cough and shortness of breath.    Cardiovascular:  Negative for chest pain, palpitations and leg swelling.   Gastrointestinal:  Positive for abdominal pain (especially in the RLQ, but also generally suprapubically) and constipation (Less over the last few days than usual). Negative for diarrhea, nausea and vomiting.        Abdominal \"bump\" in RLQ   Genitourinary:  Negative for dysuria, frequency and " hematuria.   Musculoskeletal:  Negative for arthralgias and back pain.   Skin:  Negative for color change and rash.   Neurological:  Negative for dizziness, seizures, syncope, weakness, light-headedness and headaches.   Psychiatric/Behavioral:  Negative for dysphoric mood.    All other systems reviewed and are negative.      Physical Exam  ED Triage Vitals [12/22/23 1147]   Temperature Pulse Respirations Blood Pressure SpO2   97.6 °F (36.4 °C) 58 16 148/66 100 %      Temp Source Heart Rate Source Patient Position - Orthostatic VS BP Location FiO2 (%)   Oral Monitor Sitting Right arm --      Pain Score       9             Orthostatic Vital Signs  Vitals:    12/22/23 2000 12/22/23 2032 12/22/23 2110 12/22/23 2210   BP: 118/64 122/68 113/64 115/60   Pulse: 60 59 58 59   Patient Position - Orthostatic VS:           Physical Exam  Vitals and nursing note reviewed.   Constitutional:       General: She is not in acute distress.     Appearance: She is well-developed.   HENT:      Head: Normocephalic and atraumatic.      Mouth/Throat:      Mouth: Mucous membranes are moist.      Pharynx: Oropharynx is clear.   Eyes:      Extraocular Movements: Extraocular movements intact.      Conjunctiva/sclera: Conjunctivae normal.   Cardiovascular:      Rate and Rhythm: Normal rate and regular rhythm.      Pulses: Normal pulses.      Heart sounds: Normal heart sounds. No murmur heard.  Pulmonary:      Effort: Pulmonary effort is normal. No respiratory distress.      Breath sounds: Normal breath sounds. No wheezing, rhonchi or rales.   Abdominal:      General: Abdomen is flat. There is no distension.      Palpations: Abdomen is soft.      Tenderness: There is abdominal tenderness in the right lower quadrant and suprapubic area. There is no right CVA tenderness, left CVA tenderness, guarding or rebound. Positive signs include Rovsing's sign.      Hernia: A hernia (Right lower quadrant) is present.   Musculoskeletal:      Cervical back:  Normal range of motion.      Right lower leg: No edema.      Left lower leg: No edema.   Skin:     General: Skin is warm and dry.      Capillary Refill: Capillary refill takes less than 2 seconds.      Coloration: Skin is not mottled.      Findings: No erythema or rash.   Neurological:      General: No focal deficit present.      Mental Status: She is alert and oriented to person, place, and time.      Sensory: No sensory deficit.      Motor: No weakness.   Psychiatric:         Mood and Affect: Mood normal.         Behavior: Behavior normal.         ED Medications  Medications   ceFAZolin (ANCEF) IVPB (premix in dextrose) 2,000 mg 50 mL (2,000 mg Intravenous New Bag 12/22/23 2016)   metroNIDAZOLE (FLAGYL) IVPB (premix) 500 mg 100 mL (500 mg Intravenous New Bag 12/23/23 0035)   atenolol (TENORMIN) tablet 25 mg (has no administration in time range)   LORazepam (ATIVAN) tablet 1 mg (1 mg Oral Given 12/23/23 0035)   lactated ringers infusion (100 mL/hr Intravenous New Bag 12/22/23 2001)   ondansetron (ZOFRAN) injection 4 mg (has no administration in time range)   heparin (porcine) subcutaneous injection 5,000 Units (5,000 Units Subcutaneous Given 12/22/23 2016)   oxyCODONE (ROXICODONE) IR tablet 5 mg ( Oral MAR Unhold 12/22/23 1955)   oxyCODONE (ROXICODONE) immediate release tablet 10 mg ( Oral MAR Unhold 12/22/23 1955)   HYDROmorphone (DILAUDID) injection 0.5 mg ( Intravenous MAR Unhold 12/22/23 1955)   scopolamine (TRANSDERM-SCOP) 1 mg/3 days TD 72 hr patch 1 patch (1 patch Transdermal Given 12/22/23 1841)   iohexol (OMNIPAQUE) 350 MG/ML injection (MULTI-DOSE) 80 mL (80 mL Intravenous Given 12/22/23 1621)   HYDROmorphone (DILAUDID) injection 0.5 mg (0.5 mg Intravenous Given 12/22/23 1711)   scopolamine (TRANSDERM-SCOP) 1 mg/3 days TD 72 hr patch **ADS Override Pull** (  Override pull for Anesthesia 12/22/23 1845)       Diagnostic Studies  Results Reviewed       Procedure Component Value Units Date/Time    Lactic acid,  plasma (w/reflex if result > 2.0) [[id number]]  (Normal) Collected: 12/22/23 2009    Lab Status: Final result Specimen: Blood from Arm, Left Updated: 12/22/23 2053     LACTIC ACID 1.1 mmol/L     Narrative:      Result may be elevated if tourniquet was used during collection.    RBC Morphology Reflex Test [[id number]] Collected: 12/22/23 1501    Lab Status: Final result Specimen: Blood from Arm, Right Updated: 12/22/23 1701    CBC and differential [[id number]]  (Abnormal) Collected: 12/22/23 1501    Lab Status: Final result Specimen: Blood from Arm, Right Updated: 12/22/23 1617     WBC 11.89 Thousand/uL      RBC 4.42 Million/uL      Hemoglobin 13.3 g/dL      Hematocrit 40.7 %      MCV 92 fL      MCH 30.1 pg      MCHC 32.7 g/dL      RDW 13.5 %      MPV 9.4 fL      Platelets 407 Thousands/uL     Narrative:      This is an appended report.  These results have been appended to a previously verified report.    Manual Differential(PHLEBS Do Not Order) [[id number]]  (Abnormal) Collected: 12/22/23 1501    Lab Status: Final result Specimen: Blood from Arm, Right Updated: 12/22/23 1617     Segmented % 89 %      Bands % 3 %      Lymphocytes % 5 %      Monocytes % 2 %      Eosinophils, % 0 %      Basophils % 1 %      Absolute Neutrophils 10.94 Thousand/uL      Lymphocytes Absolute 0.59 Thousand/uL      Monocytes Absolute 0.24 Thousand/uL      Eosinophils Absolute 0.00 Thousand/uL      Basophils Absolute 0.12 Thousand/uL      Total Counted --     RBC Morphology Normal     Platelet Estimate Increased     Large Platelet Present    Urine Microscopic [[id number]]  (Abnormal) Collected: 12/22/23 1501    Lab Status: Final result Specimen: Urine, Clean Catch Updated: 12/22/23 1600     RBC, UA None Seen /hpf      WBC, UA 1-2 /hpf      Epithelial Cells Occasional /hpf      Bacteria, UA Occasional /hpf      MUCUS THREADS Moderate     Hyaline Casts, UA Innumerable /lpf      Amorphous Crystals, UA Innumerable    Comprehensive metabolic panel  [[id number]] Collected: 12/22/23 1501    Lab Status: Final result Specimen: Blood from Arm, Right Updated: 12/22/23 1528     Sodium 139 mmol/L      Potassium 3.9 mmol/L      Chloride 104 mmol/L      CO2 25 mmol/L      ANION GAP 10 mmol/L      BUN 17 mg/dL      Creatinine 0.74 mg/dL      Glucose 115 mg/dL      Calcium 9.6 mg/dL      AST 20 U/L      ALT 19 U/L      Alkaline Phosphatase 89 U/L      Total Protein 7.7 g/dL      Albumin 4.6 g/dL      Total Bilirubin 0.48 mg/dL      eGFR 88 ml/min/1.73sq m     Narrative:      [employer] Kid[employer] guidelines for Chronic Kidney Disease (CKD):     Stage 1 with normal or high GFR (GFR > 90 mL/min/1.73 square meters)    Stage 2 Mild CKD (GFR = 60-89 mL/min/1.73 square meters)    Stage 3A Moderate CKD (GFR = 45-59 mL/min/1.73 square meters)    Stage 3B Moderate CKD (GFR = 30-44 mL/min/1.73 square meters)    Stage 4 Severe CKD (GFR = 15-29 mL/min/1.73 square meters)    Stage 5 End Stage CKD (GFR <15 mL/min/1.73 square meters)  Note: GFR calculation is accurate only with a steady state creatinine    UA w Reflex to Microscopic w Reflex to Culture [[id number]]  (Abnormal) Collected: 12/22/23 1501    Lab Status: Final result Specimen: Urine, Clean Catch Updated: 12/22/23 1516     Color, UA Yellow     Clarity, UA Turbid     Specific Gravity, UA 1.025     pH, UA 8.0     Leukocytes, UA Negative     Nitrite, UA Negative     Protein, UA 30 (1+) mg/dl      Glucose, UA Negative mg/dl      Ketones, UA Negative mg/dl      Urobilinogen, UA <2.0 mg/dl      Bilirubin, UA Negative     Occult Blood, UA Negative                   CT abdomen pelvis with contrast   Final Result by Wendi Faye MD (12/22 1654)      Right femoral hernia with evolving closed-loop obstruction      The hernia sac contains small bowel loop and fluid   No pneumatosis seen   Surgical evaluation suggested      I personally discussed this study with WELLINGTON DAMIAN on 12/22/2023 4:54 PM.           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  Pain resulted in her having to double over in pain, she reports feeling chilled, sweaty, and her daughter at bedside reports that she also appeared pale/green.  Pain lasted for a few minutes, and concerned she decided to present to the ED.  Since arriving in the ED she reports the pain is no longer present, only mild tenderness on deep palpation of the suprapubic regions.     Reports having COVID last week, however has no residual symptoms.  Also reports GI symptoms in November which resulted in retching, however this nausea, vomiting and diarrhea was not associated with any pain.  Has not had symptoms for over a month at this point.    Patient has a chronic \"cyst\" in her RLQ that she reports she's had for years. Reports it is now bigger and tense when she pushes on it since. Denies heavy lifting today, denies trauma. Reports she did have a bowel movement and did pass flatus. Normal appetite the last few days.    In the ED she is afebrile, vital signs within normal limits.    On exam Heart regular rate and rhythm. Lungs clear bilaterally. Abdomen soft, tender in suprapubic region. There is an oblong structure concerning for a hernia in the right lower quadrant, mildly tender, tense on palpation. No overlying skin changes, no erythema, no duskiness.  Pulses, sensation and strength intact in all four extremities. No peripheral edema.   1522 WBC(!): 11.89  Very mild leukocytosis. Will consider clinical context as well as recent COVID infection.   1522 Platelet " "Count(!): 407  Very mild elevation   1522 Hemoglobin: 13.3   1523 Nitrite, UA: Negative   1523 Leukocytes, UA: Negative   1523 Blood Pressure: 126/72  126/72, HR 68, RR 18, SpO2 100% RA   1545 Sodium: 139   1545 Potassium: 3.9   1545 Anion Gap: 10   1545 Creatinine: 0.74   1546 Glucose, Random: 115   1546 TOTAL BILIRUBIN: 0.48   1546 AST: 20   1546 ALT: 19   1546 Alkaline Phosphatase: 89   1605 Informed by nursing that patient was having increased pain.    R-eevluated at bedside. Patient reports she is now having 5/10 pain in her RLQ radiating to the suprapubic region. Exam is unchanges. Mild tenderness around the cystic structure, and suprapubic region, still no skin changes, erythema. Does report pain is worse when pressing the left side.   1651 Reading room reached out about results of CT. Report right femoral hernia with concern for evolving closed-loop obstruction.    Will reach out to Surgery for evaluation.   1658 Segs Relative(!): 89   1706 Patient re-evaluated at bedside, discussed CT read.    Patient is experiencing 7/10. Will give dilaudid.   1710 Surgery at bedside   1710 CT abdomen pelvis with contrast  Final impression: \"Right femoral hernia with evolving closed-loop obstruction     The hernia sac contains small bowel loop and fluid  No pneumatosis seen  Surgical evaluation suggested\"   1711 Blood Pressure: 116/67  116/67, HR 69, RR 18, SpO 100% RA    Surgery has accepted to their service.                             SBIRT 20yo+      Flowsheet Row Most Recent Value   Initial Alcohol Screen: US AUDIT-C     1. How often do you have a drink containing alcohol? 0 Filed at: 12/22/2023 1150   2. How many drinks containing alcohol do you have on a typical day you are drinking?  0 Filed at: 12/22/2023 1150   3a. Male UNDER 65: How often do you have five or more drinks on one occasion? 0 Filed at: 12/22/2023 1150   3b. FEMALE Any Age, or MALE 65+: How often do you have 4 or more drinks on one occassion? 0 Filed " "at: 12/22/2023 1150   Audit-C Score 0 Filed at: 12/22/2023 1150   ALISON: How many times in the past year have you...    Used an illegal drug or used a prescription medication for non-medical reasons? Never Filed at: 12/22/2023 1150                  Medical Decision Making  Initial ED assessment:  58 y.o. female with history of a chronic \"cyst\" and anxiety presenting for sudden onset abdominal pain in the RLQ as distention of her the \"cyst.\" Pain lasted for a few minutes, since resolved upon presentation, only mild tenderness on deep palpation of the suprapubic regions. Had COVID last week, no residual symptoms, also GI symptoms in November which resulted in forceful vomiting and retching. Has not had symptoms for over a month at this point. Had not noticed specific enlargement of her \"cyst.\" \"Cyst\" is chronic in her RLQ, reports it is now bigger since this morning and tense when she pushes on it. Denies heavy lifting today, denies trauma. Reports she did have a bowel movement and did pass flatus. Normal appetite the last few days.    In the ED she is afebrile, vital signs within normal limits.  On exam Heart regular rate and rhythm. Lungs clear bilaterally. Abdomen soft, tender in suprapubic region. There is an oblong structure concerning for a hernia in the right lower quadrant, mildly tender, tense on palpation. No overlying skin changes, no erythema, no duskiness.  Pulses, sensation and strength intact in all four extremities. No peripheral edema.    Initial DDx includes but is not limited to:   Hernia with or without incarceration.  Appendicitis, gastroenteritis, gastritis, PUD, GERD, gastroparesis, hepatitis, pancreatitis, colitis, enteritis, food poisoning, mesenteric adenitis, IBD, IBS, ileus, bowel obstruction, intussusception, volvulus, cholecystitis, biliary colic, choledocholithiasis, perforated viscus, splenic etiology, constipation, pelvic pathology, renal colic, pyelonephritis, UTI.    Updates:    See " ED Course    Final ED summary/disposition: After evaluation and workup in the emergency department discussed patient's case with Surgery regarding admission who accepted the patient for further evaluation and management under Dr. Dickerson.    Amount and/or Complexity of Data Reviewed  Labs: ordered. Decision-making details documented in ED Course.  Radiology: ordered. Decision-making details documented in ED Course.    Risk  Prescription drug management.          Disposition  Final diagnoses:   Non-recurrent unilateral femoral hernia with obstruction without gangrene     Time reflects when diagnosis was documented in both MDM as applicable and the Disposition within this note       Time User Action Codes Description Comment    12/22/2023  5:01 PM Sallie Drake Add [K41.30] Non-recurrent unilateral femoral hernia with obstruction without gangrene     12/22/2023  6:54 PM Abbey Hugo Modify [K41.30] Non-recurrent unilateral femoral hernia with obstruction without gangrene           ED Disposition       None          Follow-up Information       Follow up With Specialties Details Why Contact Info    Reagan Dickerson,  General Surgery Schedule an appointment as soon as possible for a visit in 2 week(s)  [address] [place] [address]  [phone]              Current Discharge Medication List        START taking these medications    Details   oxyCODONE (ROXICODONE) 5 immediate release tablet Take 1 tablet (5 mg total) by mouth every 4 (four) hours as needed for moderate pain for up to 10 days Max Daily Amount: 30 mg  Qty: 10 tablet, Refills: 0    Associated Diagnoses: Non-recurrent unilateral femoral hernia with obstruction without gangrene           CONTINUE these medications which have NOT CHANGED    Details   !! atenolol (TENORMIN) 25 mg tablet Take 25 mg by mouth daily      !! atenolol (TENORMIN) 50 mg tablet Take 0.5 tablets by mouth daily      !! atenolol (TENORMIN) 50 mg tablet        EPINEPHrine (EPIPEN) 0.3 mg/0.3 mL SOAJ Inject 0.3 mL (0.3 mg total) into a muscle once for 1 dose  Qty: 4 each, Refills: 3    Associated Diagnoses: Angioedema, initial encounter; Throat swelling; Anxiety      LORazepam (ATIVAN) 1 mg tablet Take 1 tablet (1 mg total) by mouth every 8 (eight) hours as needed for anxiety    Associated Diagnoses: Anxiety       !! - Potential duplicate medications found. Please discuss with provider.        No discharge procedures on file.    PDMP Review         Value Time User    PDMP Reviewed  Yes 3/12/2022  6:42 AM Vidal Braxton MD             ED Provider  Attending physically available and evaluated Verónica Montiel. I managed the patient along with the ED Attending.    Electronically Signed by           Giovanna Ramos MD  12/23/23 0049

## 2023-12-22 NOTE — ED ATTENDING ATTESTATION
"12/22/2023  I, Hakan Almonte MD, saw and evaluated the patient. I have discussed the patient with the resident/non-physician practitioner and agree with the resident's/non-physician practitioner's findings, Plan of Care, and MDM as documented in the resident's/non-physician practitioner's note, except where noted. All available labs and Radiology studies were reviewed.  I was present for key portions of any procedure(s) performed by the resident/non-physician practitioner and I was immediately available to provide assistance.       At this point I agree with the current assessment done in the Emergency Department.  I have conducted an independent evaluation of this patient a history and physical is as follows: Severe right lower quadrant pain earlier today, no real pain upon my initial evaluation.  Patient with a known \"cyst\" in her right lower quadrant.  Denies any previous surgical history.    Mild tenderness in the right lower quadrant at site of apparent hernia.  No signs of strangulation or incarceration on initial examination.  CT scan concerning for right femoral hernia with evolving closed-loop obstruction.      Appreciate the emergent general surgical evaluation following results of CT scan, patient made n.p.o., seen in the emergency department by surgical team and admitted to their service.    Results Reviewed       Procedure Component Value Units Date/Time    Lactic acid, plasma (w/reflex if result > 2.0) [[id number]]     Lab Status: No result Specimen: Blood     RBC Morphology Reflex Test [[id number]] Collected: 12/22/23 1501    Lab Status: Final result Specimen: Blood from Arm, Right Updated: 12/22/23 1701    CBC and differential [[id number]]  (Abnormal) Collected: 12/22/23 1501    Lab Status: Final result Specimen: Blood from Arm, Right Updated: 12/22/23 1617     WBC 11.89 Thousand/uL      RBC 4.42 Million/uL      Hemoglobin 13.3 g/dL      Hematocrit 40.7 %      MCV 92 fL      MCH 30.1 pg      " MCHC 32.7 g/dL      RDW 13.5 %      MPV 9.4 fL      Platelets 407 Thousands/uL     Narrative:      This is an appended report.  These results have been appended to a previously verified report.    Manual Differential(PHLEBS Do Not Order) [[id number]]  (Abnormal) Collected: 12/22/23 1501    Lab Status: Final result Specimen: Blood from Arm, Right Updated: 12/22/23 1617     Segmented % 89 %      Bands % 3 %      Lymphocytes % 5 %      Monocytes % 2 %      Eosinophils, % 0 %      Basophils % 1 %      Absolute Neutrophils 10.94 Thousand/uL      Lymphocytes Absolute 0.59 Thousand/uL      Monocytes Absolute 0.24 Thousand/uL      Eosinophils Absolute 0.00 Thousand/uL      Basophils Absolute 0.12 Thousand/uL      Total Counted --     RBC Morphology Normal     Platelet Estimate Increased     Large Platelet Present    Urine Microscopic [[id number]]  (Abnormal) Collected: 12/22/23 1501    Lab Status: Final result Specimen: Urine, Clean Catch Updated: 12/22/23 1600     RBC, UA None Seen /hpf      WBC, UA 1-2 /hpf      Epithelial Cells Occasional /hpf      Bacteria, UA Occasional /hpf      MUCUS THREADS Moderate     Hyaline Casts, UA Innumerable /lpf      Amorphous Crystals, UA Innumerable    Comprehensive metabolic panel [[id number]] Collected: 12/22/23 1501    Lab Status: Final result Specimen: Blood from Arm, Right Updated: 12/22/23 1528     Sodium 139 mmol/L      Potassium 3.9 mmol/L      Chloride 104 mmol/L      CO2 25 mmol/L      ANION GAP 10 mmol/L      BUN 17 mg/dL      Creatinine 0.74 mg/dL      Glucose 115 mg/dL      Calcium 9.6 mg/dL      AST 20 U/L      ALT 19 U/L      Alkaline Phosphatase 89 U/L      Total Protein 7.7 g/dL      Albumin 4.6 g/dL      Total Bilirubin 0.48 mg/dL      eGFR 88 ml/min/1.73sq m     Narrative:      National Kidney Disease Foundation guidelines for Chronic Kidney Disease (CKD):     Stage 1 with normal or high GFR (GFR > 90 mL/min/1.73 square meters)    Stage 2 Mild CKD (GFR = 60-89 mL/min/1.73  square meters)    Stage 3A Moderate CKD (GFR = 45-59 mL/min/1.73 square meters)    Stage 3B Moderate CKD (GFR = 30-44 mL/min/1.73 square meters)    Stage 4 Severe CKD (GFR = 15-29 mL/min/1.73 square meters)    Stage 5 End Stage CKD (GFR <15 mL/min/1.73 square meters)  Note: GFR calculation is accurate only with a steady state creatinine    UA w Reflex to Microscopic w Reflex to Culture [[id number]]  (Abnormal) Collected: 12/22/23 1501    Lab Status: Final result Specimen: Urine, Clean Catch Updated: 12/22/23 1516     Color, UA Yellow     Clarity, UA Turbid     Specific Gravity, UA 1.025     pH, UA 8.0     Leukocytes, UA Negative     Nitrite, UA Negative     Protein, UA 30 (1+) mg/dl      Glucose, UA Negative mg/dl      Ketones, UA Negative mg/dl      Urobilinogen, UA <2.0 mg/dl      Bilirubin, UA Negative     Occult Blood, UA Negative              CT abdomen pelvis with contrast   Final Result by Wendi Faye MD (12/22 1654)      Right femoral hernia with evolving closed-loop obstruction      The hernia sac contains small bowel loop and fluid   No pneumatosis seen   Surgical evaluation suggested      I personally discussed this study with WELLINGTON DAMIAN on 12/22/2023 4:54 PM.            Workstation performed: [id number]               ED Course         Critical Care Time  Procedures

## 2023-12-23 LAB
ANION GAP SERPL CALCULATED.3IONS-SCNC: 7 MMOL/L
BASOPHILS # BLD MANUAL: 0 THOUSAND/UL (ref 0–0.1)
BASOPHILS NFR MAR MANUAL: 0 % (ref 0–1)
BUN SERPL-MCNC: 15 MG/DL (ref 5–25)
CALCIUM SERPL-MCNC: 8.7 MG/DL (ref 8.4–10.2)
CHLORIDE SERPL-SCNC: 106 MMOL/L (ref 96–108)
CO2 SERPL-SCNC: 25 MMOL/L (ref 21–32)
CREAT SERPL-MCNC: 0.66 MG/DL (ref 0.6–1.3)
EOSINOPHIL # BLD MANUAL: 0 THOUSAND/UL (ref 0–0.4)
EOSINOPHIL NFR BLD MANUAL: 0 % (ref 0–6)
ERYTHROCYTE [DISTWIDTH] IN BLOOD BY AUTOMATED COUNT: 13.6 % (ref 11.6–15.1)
GFR SERPL CREATININE-BSD FRML MDRD: 97 ML/MIN/1.73SQ M
GLUCOSE SERPL-MCNC: 125 MG/DL (ref 65–140)
HCT VFR BLD AUTO: 34.2 % (ref 34.8–46.1)
HGB BLD-MCNC: 10.8 G/DL (ref 11.5–15.4)
LYMPHOCYTES # BLD AUTO: 0.53 THOUSAND/UL (ref 0.6–4.47)
LYMPHOCYTES # BLD AUTO: 6 % (ref 14–44)
MAGNESIUM SERPL-MCNC: 2.3 MG/DL (ref 1.9–2.7)
MCH RBC QN AUTO: 29.4 PG (ref 26.8–34.3)
MCHC RBC AUTO-ENTMCNC: 31.6 G/DL (ref 31.4–37.4)
MCV RBC AUTO: 93 FL (ref 82–98)
MONOCYTES # BLD AUTO: 0.09 THOUSAND/UL (ref 0–1.22)
MONOCYTES NFR BLD: 1 % (ref 4–12)
NEUTROPHILS # BLD MANUAL: 8.29 THOUSAND/UL (ref 1.85–7.62)
NEUTS BAND NFR BLD MANUAL: 4 % (ref 0–8)
NEUTS SEG NFR BLD AUTO: 89 % (ref 43–75)
PHOSPHATE SERPL-MCNC: 4.5 MG/DL (ref 2.7–4.5)
PLATELET # BLD AUTO: 325 THOUSANDS/UL (ref 149–390)
PLATELET BLD QL SMEAR: ADEQUATE
PMV BLD AUTO: 9.3 FL (ref 8.9–12.7)
POTASSIUM SERPL-SCNC: 3.9 MMOL/L (ref 3.5–5.3)
RBC # BLD AUTO: 3.67 MILLION/UL (ref 3.81–5.12)
RBC MORPH BLD: PRESENT
SODIUM SERPL-SCNC: 138 MMOL/L (ref 135–147)
STOMATOCYTES BLD QL SMEAR: PRESENT
WBC # BLD AUTO: 8.91 THOUSAND/UL (ref 4.31–10.16)

## 2023-12-23 PROCEDURE — 83735 ASSAY OF MAGNESIUM: CPT | Performed by: STUDENT IN AN ORGANIZED HEALTH CARE EDUCATION/TRAINING PROGRAM

## 2023-12-23 PROCEDURE — 85007 BL SMEAR W/DIFF WBC COUNT: CPT | Performed by: STUDENT IN AN ORGANIZED HEALTH CARE EDUCATION/TRAINING PROGRAM

## 2023-12-23 PROCEDURE — 80048 BASIC METABOLIC PNL TOTAL CA: CPT | Performed by: STUDENT IN AN ORGANIZED HEALTH CARE EDUCATION/TRAINING PROGRAM

## 2023-12-23 PROCEDURE — 99024 POSTOP FOLLOW-UP VISIT: CPT | Performed by: SURGERY

## 2023-12-23 PROCEDURE — 85027 COMPLETE CBC AUTOMATED: CPT | Performed by: STUDENT IN AN ORGANIZED HEALTH CARE EDUCATION/TRAINING PROGRAM

## 2023-12-23 PROCEDURE — 84100 ASSAY OF PHOSPHORUS: CPT | Performed by: STUDENT IN AN ORGANIZED HEALTH CARE EDUCATION/TRAINING PROGRAM

## 2023-12-23 RX ADMIN — OXYCODONE HYDROCHLORIDE 5 MG: 5 TABLET ORAL at 16:38

## 2023-12-23 RX ADMIN — METRONIDAZOLE 500 MG: 500 INJECTION, SOLUTION INTRAVENOUS at 00:35

## 2023-12-23 RX ADMIN — HYDROMORPHONE HYDROCHLORIDE 0.5 MG: 1 INJECTION, SOLUTION INTRAMUSCULAR; INTRAVENOUS; SUBCUTANEOUS at 19:36

## 2023-12-23 RX ADMIN — CEFAZOLIN SODIUM 2000 MG: 2 SOLUTION INTRAVENOUS at 06:05

## 2023-12-23 RX ADMIN — HEPARIN SODIUM 5000 UNITS: 5000 INJECTION INTRAVENOUS; SUBCUTANEOUS at 04:15

## 2023-12-23 RX ADMIN — CEFAZOLIN SODIUM 2000 MG: 2 SOLUTION INTRAVENOUS at 21:37

## 2023-12-23 RX ADMIN — METRONIDAZOLE 500 MG: 500 INJECTION, SOLUTION INTRAVENOUS at 16:39

## 2023-12-23 RX ADMIN — OXYCODONE HYDROCHLORIDE 5 MG: 5 TABLET ORAL at 21:37

## 2023-12-23 RX ADMIN — HEPARIN SODIUM 5000 UNITS: 5000 INJECTION INTRAVENOUS; SUBCUTANEOUS at 19:36

## 2023-12-23 RX ADMIN — LORAZEPAM 1 MG: 1 TABLET ORAL at 00:35

## 2023-12-23 RX ADMIN — LORAZEPAM 1 MG: 1 TABLET ORAL at 21:37

## 2023-12-23 RX ADMIN — METRONIDAZOLE 500 MG: 500 INJECTION, SOLUTION INTRAVENOUS at 08:37

## 2023-12-23 RX ADMIN — SODIUM CHLORIDE, SODIUM LACTATE, POTASSIUM CHLORIDE, AND CALCIUM CHLORIDE 75 ML/HR: .6; .31; .03; .02 INJECTION, SOLUTION INTRAVENOUS at 11:53

## 2023-12-23 RX ADMIN — HEPARIN SODIUM 5000 UNITS: 5000 INJECTION INTRAVENOUS; SUBCUTANEOUS at 11:56

## 2023-12-23 RX ADMIN — CEFAZOLIN SODIUM 2000 MG: 2 SOLUTION INTRAVENOUS at 13:50

## 2023-12-23 RX ADMIN — SODIUM CHLORIDE, SODIUM LACTATE, POTASSIUM CHLORIDE, AND CALCIUM CHLORIDE 500 ML: .6; .31; .03; .02 INJECTION, SOLUTION INTRAVENOUS at 04:15

## 2023-12-23 NOTE — UTILIZATION REVIEW
NOTIFICATION OF INPATIENT ADMISSION   AUTHORIZATION REQUEST   SERVICING FACILITY:   [place] [address]  Tax ID: [id number]  NPI: [id number]   ATTENDING PROVIDER:  Attending Name and NPI#: Reagan Dickerson Do [[id number]]  Address: [address] [place] [address]  Phone: [phone]     ADMISSION INFORMATION:  Place of Service: Inpatient [facility]  Place of Service Code: 21  Inpatient Admission Date/Time: 12/22/23  5:17 PM  Discharge Date/Time: No discharge date for patient encounter.  Admitting Diagnosis Code/Description:  Abdominal pain [R10.9]  Non-recurrent unilateral femoral hernia with obstruction without gangrene [K41.30]     UTILIZATION REVIEW CONTACT:  Boone Magana Utilization   Network Utilization Review Department  Phone: [phone]  Fax: [phone]  Email: [email or web address]  Contact for approvals/pending authorizations, clinical reviews, and discharge.     PHYSICIAN ADVISORY SERVICES:  Medical Necessity Denial & Jwvj-ss-Binl Review  Phone: [phone]  Fax: [phone]  Email: [email or web address]     DISCHARGE SUPPORT TEAM:  For Patients Discharge Needs & Updates  Phone: [phone] opt. 2 Fax: [phone]  Email: [email or web address]

## 2023-12-23 NOTE — ANESTHESIA POSTPROCEDURE EVALUATION
Post-Op Assessment Note    CV Status:  Stable  Pain Score: 0    Pain management: adequate    Multimodal analgesia used between 6 hours prior to anesthesia start to PACU discharge    Mental Status:  Awake   Hydration Status:  Stable and euvolemic   PONV Controlled:  None   Airway Patency:  Patent     Post Op Vitals Reviewed: Yes      Staff: CRNA               BP   130/59   Temp 98.3   Pulse 69   Resp 16   SpO2 95

## 2023-12-23 NOTE — PLAN OF CARE
Problem: PAIN - ADULT  Goal: Verbalizes/displays adequate comfort level or baseline comfort level  Description: Interventions:  - Encourage patient to monitor pain and request assistance  - Assess pain using appropriate pain scale  - Administer analgesics based on type and severity of pain and evaluate response  - Implement non-pharmacological measures as appropriate and evaluate response  - Consider cultural and social influences on pain and pain management  - Notify physician/advanced practitioner if interventions unsuccessful or patient reports new pain  12/23/2023 1150 by Vira Sheth RN  Outcome: Progressing  12/23/2023 1150 by Vira Sheth RN  Outcome: Progressing     Problem: INFECTION - ADULT  Goal: Absence or prevention of progression during hospitalization  Description: INTERVENTIONS:  - Assess and monitor for signs and symptoms of infection  - Monitor lab/diagnostic results  - Monitor all insertion sites, i.e. indwelling lines, tubes, and drains  - Monitor endotracheal if appropriate and nasal secretions for changes in amount and color  - [place] appropriate cooling/warming therapies per order  - Administer medications as ordered  - Instruct and encourage patient and family to use good hand hygiene technique  - Identify and instruct in appropriate isolation precautions for identified infection/condition  12/23/2023 1150 by Vira Sheth RN  Outcome: Progressing  12/23/2023 1150 by Vira Sheth RN  Outcome: Progressing     Problem: DISCHARGE PLANNING  Goal: Discharge to home or other facility with appropriate resources  Description: INTERVENTIONS:  - Identify barriers to discharge w/patient and caregiver  - Arrange for needed discharge resources and transportation as appropriate  - Identify discharge learning needs (meds, wound care, etc.)  - Arrange for interpretive services to assist at discharge as needed  - Refer to Case Management Department for coordinating discharge planning if  the patient needs post-hospital services based on physician/advanced practitioner order or complex needs related to functional status, cognitive ability, or social support system  12/23/2023 1150 by Vira Sheth RN  Outcome: Progressing  12/23/2023 1150 by Vira Sheth RN  Outcome: Progressing     Problem: SKIN/TISSUE INTEGRITY - ADULT  Goal: Incision(s), wounds(s) or drain site(s) healing without S/S of infection  Description: INTERVENTIONS  - Assess and document dressing, incision, wound bed, drain sites and surrounding tissue  - Provide patient and family education  - Perform skin care/dressing changes every   12/23/2023 1150 by Vira Sheth RN  Outcome: Progressing  12/23/2023 1150 by Vira Sheth RN  Outcome: Progressing

## 2023-12-23 NOTE — OP NOTE
OPERATIVE REPORT  PATIENT NAME: Verónica Montiel    :  1965  MRN: [id number]  Pt Location: AN OR ROOM 04    SURGERY DATE: 2023    Surgeons and Role:     * Reagan Dickerson DO - Primary     * Sallie Drake DO - Assisting    Preop Diagnosis:  Non-recurrent unilateral femoral hernia with obstruction without gangrene [K41.30]    Post-Op Diagnosis Codes:     * Non-recurrent unilateral femoral hernia with obstruction without gangrene [K41.30]    Procedure(s):  REPAIR OF STRANGULATED FEMORAL HERNIA. SMALL BOWEL RESECTION    Specimen(s):  ID Type Source Tests Collected by Time Destination   1 : Portion of Small Bowel Tissue Soft Tissue, Other TISSUE EXAM Reagan Dickerson DO 2023 1853        Estimated Blood Loss:   Minimal    Drains:  Urethral Catheter Double-lumen;Latex 16 Fr. (Active)   Number of days: 0       Anesthesia Type:   General    Operative Indications:  Non-recurrent unilateral femoral hernia with obstruction without gangrene [K41.30]      Operative Findings:  Strangulated right femoral hernia containing nonviable small bowel.  Segmental small bowel resection and primary anastomosis performed with MARY ANN 75 mm and TA 60 mm staplers. This was all completed through a right groin incision.  Primary repair of defect performed using 0 prolene figure of 8 sutures, approximating iliopubic tract to Dylan's ligament.     Complications:   None    Procedure and Technique:  The patient was brought to the operating room and identified verbally and via wristband. She was transferred to the operating room table and positioned supine. General endotracheal anesthesia was induced successfully. The patient's abdomen and right groin were prepped and draped in the usual sterile fashion. Pre-operative antibiotics were administered. A time out was performed confirming the patient, procedure, and site.  All parties were in agreement.    Attention was turned to the right groin where a 5 cm incision was made  with a 15 blade scalpel. Dissection was deepened through the subcutaneous tissue with bovie electrocautery through salvador's fascia. Hernia sac was encountered, which was circumferentially liberated from surrounding tissue with a combination of blunt finger dissection was well as bovie electrocautery, taking care not to violate the sac. The sac was circumferentially liberated and subsequenty entered sharply with mariposa scissors. Hemorrhagic ascites was suctinoed from the sac. A 10 cm portion of small bowel was present within the sac, which appeared dusky and frankly nonviable. No perforation was present. The small bowel was elevated through the hernia and a segmental small bowel resection was performed with a75 mm MARY ANN stapler. The limbs were oriented appropriately and an anastomosis was created with a combination of a 75 mm MARY ANN stapler to create the common channel and a TA 60 mm stapler to close the common enterotomy. The staple line was imbricated in a lembert fashion with 2-0 silk suture. The small bowel was reduced through the defect. The hernia sac was then closed with 2-0 vicryl suture in a running fashion. The hernia sac was then also reduced. The wound was irrigated with normal saline. Scarpas fascia was closed with 2-0 vicryl suture in a running fashion. The skin was re-approximated with 4-0 monocryl suture in a running subcuticular fashion and covered with exofin skin glue.     The patient was then allowed to awaken, extubated, and transferred to the PACU having tolerated the procedure well.  All instrument, needle, and sponge counts were correct at the end of the case. Radiofrequency detection was negative.    Dr. Dickerson was present for the entire procedure      Patient Disposition:  PACU         SIGNATURE: Sallie Drake DO  DATE: December 22, 2023  TIME: 7:00 PM

## 2023-12-23 NOTE — UTILIZATION REVIEW
Initial Clinical Review    Admission: Date/Time/Statement:   Admission Orders (From admission, onward)       Ordered        12/22/23 1717  Inpatient Admission  Once                          Orders Placed This Encounter   Procedures    Inpatient Admission     Standing Status:   Standing     Number of Occurrences:   1     Order Specific Question:   Level of Care     Answer:   Med Surg [16]     Order Specific Question:   Estimated length of stay     Answer:   More than 2 Midnights     Order Specific Question:   Certification     Answer:   I certify that inpatient services are medically necessary for this patient for a duration of greater than two midnights. See H&P and MD Progress Notes for additional information about the patient's course of treatment.     ED Arrival Information       Expected   -    Arrival   12/22/2023 11:35    Acuity   Urgent              Means of arrival   Walk-In    Escorted by   Self    Service   Surgery-General    Admission type   Emergency              Arrival complaint   Abdominal Pain             Chief Complaint   Patient presents with    Abdominal Pain     Patient presents to ED with right lower quadrant abdominal pain that started this morning with a distention present on right side. Covid a week ago.        Initial Presentation: 58 y.o. female from home to ED admitted inpatient due to right femoral hernia, containing bowel, concern for closed loop obstruction.   PMH of cyst RLQ,  anxitey Presented due to  sudden onset of lower abdominal pain, greater in RLQ on morning of arrival while at work.  Lasted few minutes, felt chilled and diaphoretic.    On exam: abdominal tenderness in RLQ and suprapubic area.  RLQ hernia.   Wbc 11.89.  ct abdomen  showed Right femoral hernia with evolving closed loop obstruction.   In the ED pain returned.   Given Dilaudid.   Plan is surgical intervention.  NPO.  Start ancef, continue Flagyl.  Pain control.   Procedure 12/22/23:  REPAIR OF STRANGULATED FEMORAL  HERNIA. SMALL BOWEL RESECTION   Findings Strangulated right femoral hernia containing nonviable small bowel.  Segmental small bowel resection and primary anastomosis performed with MARY ANN 75 mm and TA 60 mm staplers. This was all completed through a right groin incision.  Primary repair of defect performed using 0 prolene figure of 8 sutures, approximating iliopubic tract to Dylan's ligament.     Date: 12/23/23   Day 2: POD #1 s/p R femoral hernia repair, segmental small bowel resection .   Tolerates sips.  + flatus.  Pain control.  On exam: incision clean and dry.  right groin incision c/d/I, no tenderness, minimal edema . Umanzor.  Plan is advance to clears, pain control.  Continue IVF, remove umanzor. Continue antibiotics.     ED Triage Vitals [12/22/23 1147]   Temperature Pulse Respirations Blood Pressure SpO2   97.6 °F (36.4 °C) 58 16 148/66 100 %      Temp Source Heart Rate Source Patient Position - Orthostatic VS BP Location FiO2 (%)   Oral Monitor Sitting Right arm --      Pain Score       9          Wt Readings from Last 1 Encounters:   12/22/23 58.5 kg (129 lb)     Additional Vital Signs:   12/23/23 10:59:10 98.2 °F (36.8 °C) -- 18 107/54 72 -- -- -- -- -- Sitting   12/23/23 06:38:37 98.2 °F (36.8 °C) 50 Abnormal  19 107/55 72 97 % -- -- -- -- --   12/22/23 22:10:31 97.8 °F (36.6 °C) 59 20 115/60 78 94 % -- -- -- -- --   12/22/23 21:10:49 97.7 °F (36.5 °C) 58 17 113/64 80 95 % -- -- -- -- --   12/22/23 20:32:44 98.2 °F (36.8 °C) 59 17 122/68 86 96 % -- -- -- -- --   12/22/23 20:00:35 97.7 °F (36.5 °C) 60 19 118/64 82 95 % -- -- None (Room air) -- --   12/22/23 1945 -- 77 16 122/61 -- -- -- -- -- -- Lying   12/22/23 1930 -- 64 16 133/70 96 97 % -- -- None (Room air) -- --   12/22/23 1915 98.3 °F (36.8 °C) 66 12 125/63 88 95 % 28 2 L/min Nasal cannula WDL --   12/22/23 1739 99.4 °F (37.4 °C) 65 18 142/75 -- 97 % -- -- None (Room air) -- --   12/22/23 1600 -- 69 18 116/67 88 100 % -- -- --       Pertinent  Labs/Diagnostic Test Results:   CT abdomen pelvis with contrast   Final Result by Wendi Faye MD (12/22 1654)      Right femoral hernia with evolving closed-loop obstruction      The hernia sac contains small bowel loop and fluid   No pneumatosis seen   Surgical evaluation suggested      I personally discussed this study with WELLINGTON DAMIAN on 12/22/2023 4:54 PM.            Workstation performed: [id number]             Results from last 7 days   Lab Units 12/23/23  0440 12/22/23  1501   WBC Thousand/uL 8.91 11.89*   HEMOGLOBIN g/dL 10.8* 13.3   HEMATOCRIT % 34.2* 40.7   PLATELETS Thousands/uL 325 407*   BANDS PCT % 4 3     Results from last 7 days   Lab Units 12/23/23  0440 12/22/23  1501   SODIUM mmol/L 138 139   POTASSIUM mmol/L 3.9 3.9   CHLORIDE mmol/L 106 104   CO2 mmol/L 25 25   ANION GAP mmol/L 7 10   BUN mg/dL 15 17   CREATININE mg/dL 0.66 0.74   EGFR ml/min/1.73sq m 97 88   CALCIUM mg/dL 8.7 9.6   MAGNESIUM mg/dL 2.3  --    PHOSPHORUS mg/dL 4.5  --      Results from last 7 days   Lab Units 12/22/23  1501   AST U/L 20   ALT U/L 19   ALK PHOS U/L 89   TOTAL PROTEIN g/dL 7.7   ALBUMIN g/dL 4.6   TOTAL BILIRUBIN mg/dL 0.48     Results from last 7 days   Lab Units 12/23/23  0440 12/22/23  1501   GLUCOSE RANDOM mg/dL 125 115     Results from last 7 days   Lab Units 12/22/23 2009   LACTIC ACID mmol/L 1.1     Results from last 7 days   Lab Units 12/22/23  1501   CLARITY UA  Turbid   COLOR UA  Yellow   SPEC GRAV UA  1.025   PH UA  8.0   GLUCOSE UA mg/dl Negative   KETONES UA mg/dl Negative   BLOOD UA  Negative   PROTEIN UA mg/dl 30 (1+)*   NITRITE UA  Negative   BILIRUBIN UA  Negative   UROBILINOGEN UA (BE) mg/dl <2.0   LEUKOCYTES UA  Negative   WBC UA /hpf 1-2   RBC UA /hpf None Seen   BACTERIA UA /hpf Occasional   EPITHELIAL CELLS WET PREP /hpf Occasional   MUCUS THREADS  Moderate*       ED Treatment:   Medication Administration from 12/22/2023 1135 to 12/22/2023 1736         Date/Time Order Dose Route  Action Comments     12/22/2023 1711 EST HYDROmorphone (DILAUDID) injection 0.5 mg 0.5 mg Intravenous Given --          Past Medical History:   Diagnosis Date    Anaphylaxis     Anxiety      Present on Admission:   Strangulated femoral hernia      Admitting Diagnosis: Abdominal pain [R10.9]  Non-recurrent unilateral femoral hernia with obstruction without gangrene [K41.30]  Age/Sex: 58 y.o. female  Admission Orders: 12/22/23 1717 inpatient   Scheduled Medications:  atenolol, 25 mg, Oral, Daily  cefazolin, 2,000 mg, Intravenous, Q8H  heparin (porcine), 5,000 Units, Subcutaneous, Q8H AJIT  metroNIDAZOLE, 500 mg, Intravenous, Q8H  scopolamine, 1 patch, Transdermal, Q72H    lactated ringers bolus 500 mL  Dose: 500 mL  Freq: Once Route: IV  Last Dose: 500 mL (12/23/23 0415)  Start: 12/23/23 0400 End: 12/23/23 0615     bupivacaine (PF) (MARCAINE) 0.25 % injection  Start: 12/22/23 1845 End: 12/22/23 1913     Continuous IV Infusions:  lactated ringers, 75 mL/hr, Intravenous, Continuous      PRN Meds:  HYDROmorphone, 0.5 mg, Intravenous, Q3H PRN  LORazepam, 1 mg, Oral, Q8H PRN x 1 12/23/23   ondansetron, 4 mg, Intravenous, Q6H PRN  oxyCODONE, 10 mg, Oral, Q4H PRN  oxyCODONE, 5 mg, Oral, Q4H PRN    Ice to Right groin.    Ambulate TID  12/23/23 clears     None    Network Utilization Review Department  ATTENTION: Please call with any questions or concerns to [phone] and carefully listen to the prompts so that you are directed to the right person. All voicemails are confidential.   For Discharge needs, contact Care Management DC Support Team at [phone] opt. 2  Send all requests for admission clinical reviews, approved or denied determinations and any other requests to dedicated fax number below belonging to the campus where the patient is receiving treatment. List of dedicated fax numbers for the Facilities:  FACILITY NAME UR FAX NUMBER   ADMISSION DENIALS (Administrative/Medical Necessity) [phone]   DISCHARGE  SUPPORT TEAM (NETWORK) [phone]   PARENT CHILD HEALTH (Maternity/NICU/Pediatrics) [phone]   [facility] [phone]   [facility] [phone]   [facility] [phone]   [facility] [phone]   [facility] [phone]   [facility] [phone]   [facility] [phone]   [facility] [phone]   [facility] [phone]   [facility] [phone]   [facility] [phone]

## 2023-12-23 NOTE — PLAN OF CARE
Problem: PAIN - ADULT  Goal: Verbalizes/displays adequate comfort level or baseline comfort level  Description: Interventions:  - Encourage patient to monitor pain and request assistance  - Assess pain using appropriate pain scale  - Administer analgesics based on type and severity of pain and evaluate response  - Implement non-pharmacological measures as appropriate and evaluate response  - Consider cultural and social influences on pain and pain management  - Notify physician/advanced practitioner if interventions unsuccessful or patient reports new pain  Outcome: Progressing     Problem: INFECTION - ADULT  Goal: Absence or prevention of progression during hospitalization  Description: INTERVENTIONS:  - Assess and monitor for signs and symptoms of infection  - Monitor lab/diagnostic results  - Monitor all insertion sites, i.e. indwelling lines, tubes, and drains  - Monitor endotracheal if appropriate and nasal secretions for changes in amount and color  - [place] appropriate cooling/warming therapies per order  - Administer medications as ordered  - Instruct and encourage patient and family to use good hand hygiene technique  - Identify and instruct in appropriate isolation precautions for identified infection/condition  Outcome: Progressing     Problem: SKIN/TISSUE INTEGRITY - ADULT  Goal: Incision(s), wounds(s) or drain site(s) healing without S/S of infection  Description: INTERVENTIONS  - Assess and document dressing, incision, wound bed, drain sites and surrounding tissue  - Provide patient and family education  - Perform skin care/dressing changes as ordered  Outcome: Progressing

## 2023-12-24 VITALS
HEART RATE: 60 BPM | OXYGEN SATURATION: 95 % | WEIGHT: 129 LBS | DIASTOLIC BLOOD PRESSURE: 54 MMHG | HEIGHT: 64 IN | TEMPERATURE: 98.4 F | BODY MASS INDEX: 22.02 KG/M2 | RESPIRATION RATE: 16 BRPM | SYSTOLIC BLOOD PRESSURE: 96 MMHG

## 2023-12-24 LAB
ANION GAP SERPL CALCULATED.3IONS-SCNC: 5 MMOL/L
BUN SERPL-MCNC: 13 MG/DL (ref 5–25)
CALCIUM SERPL-MCNC: 8.2 MG/DL (ref 8.4–10.2)
CHLORIDE SERPL-SCNC: 107 MMOL/L (ref 96–108)
CO2 SERPL-SCNC: 29 MMOL/L (ref 21–32)
CREAT SERPL-MCNC: 0.67 MG/DL (ref 0.6–1.3)
GFR SERPL CREATININE-BSD FRML MDRD: 96 ML/MIN/1.73SQ M
GLUCOSE SERPL-MCNC: 92 MG/DL (ref 65–140)
MAGNESIUM SERPL-MCNC: 2.2 MG/DL (ref 1.9–2.7)
PHOSPHATE SERPL-MCNC: 2.9 MG/DL (ref 2.7–4.5)
POTASSIUM SERPL-SCNC: 3.5 MMOL/L (ref 3.5–5.3)
SODIUM SERPL-SCNC: 141 MMOL/L (ref 135–147)

## 2023-12-24 PROCEDURE — 84100 ASSAY OF PHOSPHORUS: CPT

## 2023-12-24 PROCEDURE — 99024 POSTOP FOLLOW-UP VISIT: CPT | Performed by: SURGERY

## 2023-12-24 PROCEDURE — 83735 ASSAY OF MAGNESIUM: CPT

## 2023-12-24 PROCEDURE — 80048 BASIC METABOLIC PNL TOTAL CA: CPT

## 2023-12-24 RX ADMIN — OXYCODONE HYDROCHLORIDE 10 MG: 10 TABLET ORAL at 05:18

## 2023-12-24 RX ADMIN — HEPARIN SODIUM 5000 UNITS: 5000 INJECTION INTRAVENOUS; SUBCUTANEOUS at 05:15

## 2023-12-24 RX ADMIN — SODIUM CHLORIDE, SODIUM LACTATE, POTASSIUM CHLORIDE, AND CALCIUM CHLORIDE 75 ML/HR: .6; .31; .03; .02 INJECTION, SOLUTION INTRAVENOUS at 05:18

## 2023-12-24 RX ADMIN — HYDROMORPHONE HYDROCHLORIDE 0.5 MG: 1 INJECTION, SOLUTION INTRAMUSCULAR; INTRAVENOUS; SUBCUTANEOUS at 08:47

## 2023-12-24 RX ADMIN — METRONIDAZOLE 500 MG: 500 INJECTION, SOLUTION INTRAVENOUS at 00:47

## 2023-12-24 RX ADMIN — METRONIDAZOLE 500 MG: 500 INJECTION, SOLUTION INTRAVENOUS at 08:37

## 2023-12-24 RX ADMIN — CEFAZOLIN SODIUM 2000 MG: 2 SOLUTION INTRAVENOUS at 05:15

## 2023-12-24 NOTE — DISCHARGE SUMMARY
Discharge Summary - Verónica Montiel 58 y.o. female MRN: [id number]    Unit/Bed#: S -01 Encounter: [id number]    Admission Date:   Admission Orders (From admission, onward)       Ordered        12/22/23 1717  Inpatient Admission  Once                            Admitting Diagnosis: Abdominal pain [R10.9]  Non-recurrent unilateral femoral hernia with obstruction without gangrene [K41.30]    HPI: 58 y.o. female who presents with right groin pain. This started after she had a viral illness where she was vomiting and retching frequently. Today she started having pain in her right groin and noticed a larger bulge that would not reduce. She denies vomiting. Had a BM today and passed flatus, but not since the pain started.      No known PMHx, no PSHx    Procedures Performed: No orders of the defined types were placed in this encounter.      Summary of Hospital Course:   12/22 presented with pain in right groin with imaging concerning for right femoral hernia containing closed loop bowel. The patient was taken to the operating room emergently for a right femoral hernia repair  12/23-24 the patient progressed well post-operatively. There was a segmental resection of small bowel during her surgery so her diet was advanced slowly. By the day of discharge, she was tolerating a regular diet, ambulating, pain well controlled. She was given discharge instructions, post op wound care and expectations. She was instructed to follow up with general surgery in 2-3 weeks.    Significant Findings, Care, Treatment and Services Provided:   12/22 right femoral hernia repair with small bowel resection    Complications: None    Discharge Diagnosis: right femoral hernia with incarcerated loop of small bowel    Medical Problems       Resolved Problems  Date Reviewed: 3/30/2022   None         Condition at Discharge: good         Discharge instructions/Information to patient and family:   See after visit summary for information provided to  patient and family.      Provisions for Follow-Up Care:  See after visit summary for information related to follow-up care and any pertinent home health orders.      PCP: Ezra Montenegro MD    Disposition: Home    Planned Readmission: No      Discharge Statement   I spent 20 minutes discharging the patient. This time was spent on the day of discharge. I had direct contact with the patient on the day of discharge. Additional documentation is required if more than 30 minutes were spent on discharge.     Discharge Medications:  See after visit summary for reconciled discharge medications provided to patient and family.

## 2023-12-24 NOTE — PLAN OF CARE
Problem: PAIN - ADULT  Goal: Verbalizes/displays adequate comfort level or baseline comfort level  Description: Interventions:  - Encourage patient to monitor pain and request assistance  - Assess pain using appropriate pain scale  - Administer analgesics based on type and severity of pain and evaluate response  - Implement non-pharmacological measures as appropriate and evaluate response  - Consider cultural and social influences on pain and pain management  - Notify physician/advanced practitioner if interventions unsuccessful or patient reports new pain  Outcome: Adequate for Discharge     Problem: INFECTION - ADULT  Goal: Absence or prevention of progression during hospitalization  Description: INTERVENTIONS:  - Assess and monitor for signs and symptoms of infection  - Monitor lab/diagnostic results  - Monitor all insertion sites, i.e. indwelling lines, tubes, and drains  - Monitor endotracheal if appropriate and nasal secretions for changes in amount and color  - [place] appropriate cooling/warming therapies per order  - Administer medications as ordered  - Instruct and encourage patient and family to use good hand hygiene technique  - Identify and instruct in appropriate isolation precautions for identified infection/condition  Outcome: Adequate for Discharge     Problem: DISCHARGE PLANNING  Goal: Discharge to home or other facility with appropriate resources  Description: INTERVENTIONS:  - Identify barriers to discharge w/patient and caregiver  - Arrange for needed discharge resources and transportation as appropriate  - Identify discharge learning needs (meds, wound care, etc.)  - Arrange for interpretive services to assist at discharge as needed  - Refer to Case Management Department for coordinating discharge planning if the patient needs post-hospital services based on physician/advanced practitioner order or complex needs related to functional status, cognitive ability, or social support  system  Outcome: Adequate for Discharge     Problem: SKIN/TISSUE INTEGRITY - ADULT  Goal: Incision(s), wounds(s) or drain site(s) healing without S/S of infection  Description: INTERVENTIONS  - Assess and document dressing, incision, wound bed, drain sites and surrounding tissue  - Provide patient and family education  - Perform skin care/dressing changes every   Outcome: Adequate for Discharge

## 2023-12-24 NOTE — PROGRESS NOTES
"Progress Note - General Surgery   Verónica Montiel 58 y.o. female MRN: [id number]  Unit/Bed#: S -01 Encounter: [id number]    Assessment:  59yo F with right femoral hernia with strangulated small bowel s/p R femoral hernia repair, segmental small bowel resection 12/22    AVSS on RA  UOP 1.5 L  Creatinine 0.67 from 0.66    Plan:  - advance diet to surg soft  - DC IV fluids  - pain control  - encourage ambulation and OOB  - Incentive spirometry  - DVT prophylaxis  - Possible discharge home today    Subjective/Objective   Subjective:   No acute events overnight.  Continues to pass flatus, no bowel movement yet. Tolerating small sips of clears. Denies n/v. Does not feel bloated. Pain is well controlled.    Objective:     Blood pressure 105/59, pulse 55, temperature 98.2 °F (36.8 °C), resp. rate 18, height 5' 4\" (1.626 m), weight 58.5 kg (129 lb), SpO2 97%.,Body mass index is 22.14 kg/m².      Intake/Output Summary (Last 24 hours) at 12/24/2023 0711  Last data filed at 12/24/2023 0518  Gross per 24 hour   Intake 1220 ml   Output 1500 ml   Net -280 ml       Invasive Devices       Peripheral Intravenous Line  Duration             Peripheral IV 12/22/23 Proximal;Right;Ventral (anterior) Forearm 1 day                    Physical Exam:  General: No acute distress  Neuro: alert and oriented  HEENT: moist mucous membranes  CV: Well perfused, regular rate and rhythm  Lungs: Normal work of breathing, no increased respiratory effort  Abdomen: Soft, non-tender, non-distended.   : right groin incision c/d/I, appropriately tender, minimal edema, no evidence of underlying hematoma  Extremities: No edema, clubbing or cyanosis  Skin: Warm, dry      Pee Kaiser MD  General Surgery PGY2    "

## 2023-12-24 NOTE — PLAN OF CARE
Problem: PAIN - ADULT  Goal: Verbalizes/displays adequate comfort level or baseline comfort level  Description: Interventions:  - Encourage patient to monitor pain and request assistance  - Assess pain using appropriate pain scale  - Administer analgesics based on type and severity of pain and evaluate response  - Implement non-pharmacological measures as appropriate and evaluate response  - Consider cultural and social influences on pain and pain management  - Notify physician/advanced practitioner if interventions unsuccessful or patient reports new pain  Outcome: Progressing     Problem: INFECTION - ADULT  Goal: Absence or prevention of progression during hospitalization  Description: INTERVENTIONS:  - Assess and monitor for signs and symptoms of infection  - Monitor lab/diagnostic results  - Monitor all insertion sites, i.e. indwelling lines, tubes, and drains  - Monitor endotracheal if appropriate and nasal secretions for changes in amount and color  - [place] appropriate cooling/warming therapies per order  - Administer medications as ordered  - Instruct and encourage patient and family to use good hand hygiene technique  - Identify and instruct in appropriate isolation precautions for identified infection/condition  Outcome: Progressing     Problem: DISCHARGE PLANNING  Goal: Discharge to home or other facility with appropriate resources  Description: INTERVENTIONS:  - Identify barriers to discharge w/patient and caregiver  - Arrange for needed discharge resources and transportation as appropriate  - Identify discharge learning needs (meds, wound care, etc.)  - Arrange for interpretive services to assist at discharge as needed  - Refer to Case Management Department for coordinating discharge planning if the patient needs post-hospital services based on physician/advanced practitioner order or complex needs related to functional status, cognitive ability, or social support system  Outcome: Progressing      Problem: SKIN/TISSUE INTEGRITY - ADULT  Goal: Incision(s), wounds(s) or drain site(s) healing without S/S of infection  Description: INTERVENTIONS  - Assess and document dressing, incision, wound bed, drain sites and surrounding tissue  - Provide patient and family education  - Perform skin care/dressing   Outcome: Progressing

## 2023-12-29 PROCEDURE — 88307 TISSUE EXAM BY PATHOLOGIST: CPT | Performed by: PATHOLOGY

## 2024-01-11 ENCOUNTER — OFFICE VISIT (OUTPATIENT)
Dept: SURGERY | Facility: CLINIC | Age: 59
End: 2024-01-11

## 2024-01-11 DIAGNOSIS — K41.30 STRANGULATED FEMORAL HERNIA: Primary | ICD-10-CM

## 2024-01-11 PROCEDURE — 99024 POSTOP FOLLOW-UP VISIT: CPT | Performed by: SURGERY

## 2024-01-11 NOTE — LETTER
January 11, 2024     Ezra Montenegro MD  [address]  [address]  [facility] [address]    Patient: Verónica Montiel   YOB: 1965   Date of Visit: 1/11/2024       Dear Dr. Montenegro:    Thank you for referring Verónica Montiel to me for evaluation. Below are my notes for this consultation.    If you have questions, please do not hesitate to call me. I look forward to following your patient along with you.         Sincerely,        Reagan Dickerson DO        CC: No Recipients    Reagan Dickerson DO  1/11/2024 10:37 AM  Sign when Signing Visit  Assessment/Plan:    Diagnoses and all orders for this visit:    Strangulated femoral hernia    Status post emergent repair of a strangulated right femoral hernia which required small bowel resection.    Doing quite well.  Asked her to refrain from lifting more than 25 pounds until January 22.  From that point on she may resume unrestricted activity.    Explained that due to the bowel resection, the femoral hernia repair was without mesh.  This does increase her chances of recurrence.  If she should notice something different in the future, I asked her to give us a call for repeat evaluation    Pathology: Reviewed with patient, all questions answered.       Postoperative restrictions reviewed. All questions answered.       ______________________________________________________  HPI: Patient presents post operatively.  Repair of strangulated femoral hernia, small bowel resection 12/22/2023   Final Diagnosis  A. Small Bowel, NOS, Portion of Small Bowel, segmental resection:     -Segment of small bowel with ischemic hemorrhagic infarction & necrotic mucosa , focally extending to resection margin  -Moderate submucosal vascular congestion and hemorrhage.  -Marked serosal hemorrhage  -No regional lymph nodes identified  -Separate segment of viable , unremarkable Small Bowel wall.  -No evidence of malignancy                     ROS:  General ROS: negative  for - chills, fatigue, fever or night sweats, weight loss  Respiratory ROS: no cough, shortness of breath, or wheezing  Cardiovascular ROS: no chest pain or dyspnea on exertion  Genito-Urinary ROS: no dysuria, trouble voiding, or hematuria  Musculoskeletal ROS: negative for - gait disturbance, joint pain or muscle pain  Neurological ROS: no TIA or stroke symptoms  GI ROS: see HPI  Skin ROS: no new rashes or lesions   Lymphatic ROS: no new adenopathy noted by pt.   GYN ROS: see HPI, no new GYN history or bleeding noted  Psy ROS: no new mental or behavioral disturbances         Patient Active Problem List   Diagnosis   • Angioedema   • Anxiety state   • Abnormal chest x-ray   • Acid reflux   • Hyperlipidemia   • Strangulated femoral hernia       Allergies:  Crab (diagnostic) - food allergy      Current Outpatient Medications:   •  atenolol (TENORMIN) 25 mg tablet, Take 25 mg by mouth daily, Disp: , Rfl:   •  EPINEPHrine (EPIPEN) 0.3 mg/0.3 mL SOAJ, Inject 0.3 mL (0.3 mg total) into a muscle once for 1 dose, Disp: 4 each, Rfl: 3  •  LORazepam (ATIVAN) 1 mg tablet, Take 1 tablet (1 mg total) by mouth every 8 (eight) hours as needed for anxiety, Disp: , Rfl:     Past Medical History:   Diagnosis Date   • Anaphylaxis    • Anxiety    • Fissure, anal 1996       Past Surgical History:   Procedure Laterality Date   • HERNIA REPAIR N/A 12/22/2023    Procedure: REPAIR OF STRANGULATED FEMORAL HERNIA, SMALL BOWEL RESECTION;  Surgeon: Reagan Dickerson DO;  Location: [facility];  Service: General       No family history on file.     reports that she has never smoked. She has never used smokeless tobacco. She reports that she does not currently use alcohol after a past usage of about 2.0 standard drinks of alcohol per week. She reports that she does not use drugs.    PHYSICAL EXAM    There were no vitals taken for this visit.    General: normal, cooperative, no distress  Abdominal: soft, nondistended, or nontender  Incision:  clean, dry, and intact and healing well healing ridge as expected over the right groin.  No signs of hernia recurrence      Reagan Dickerson,     Date: 1/11/2024 Time: 10:36 AM

## 2024-01-11 NOTE — PROGRESS NOTES
Assessment/Plan:    Diagnoses and all orders for this visit:    Strangulated femoral hernia    Status post emergent repair of a strangulated right femoral hernia which required small bowel resection.    Doing quite well.  Asked her to refrain from lifting more than 25 pounds until January 22.  From that point on she may resume unrestricted activity.    Explained that due to the bowel resection, the femoral hernia repair was without mesh.  This does increase her chances of recurrence.  If she should notice something different in the future, I asked her to give us a call for repeat evaluation    Pathology: Reviewed with patient, all questions answered.       Postoperative restrictions reviewed. All questions answered.       ______________________________________________________  HPI: Patient presents post operatively.  Repair of strangulated femoral hernia, small bowel resection 12/22/2023   Final Diagnosis  A. Small Bowel, NOS, Portion of Small Bowel, segmental resection:     -Segment of small bowel with ischemic hemorrhagic infarction & necrotic mucosa , focally extending to resection margin  -Moderate submucosal vascular congestion and hemorrhage.  -Marked serosal hemorrhage  -No regional lymph nodes identified  -Separate segment of viable , unremarkable Small Bowel wall.  -No evidence of malignancy                     ROS:  General ROS: negative for - chills, fatigue, fever or night sweats, weight loss  Respiratory ROS: no cough, shortness of breath, or wheezing  Cardiovascular ROS: no chest pain or dyspnea on exertion  Genito-Urinary ROS: no dysuria, trouble voiding, or hematuria  Musculoskeletal ROS: negative for - gait disturbance, joint pain or muscle pain  Neurological ROS: no TIA or stroke symptoms  GI ROS: see HPI  Skin ROS: no new rashes or lesions   Lymphatic ROS: no new adenopathy noted by pt.   GYN ROS: see HPI, no new GYN history or bleeding noted  Psy ROS: no new mental or behavioral disturbances          Patient Active Problem List   Diagnosis    Angioedema    Anxiety state    Abnormal chest x-ray    Acid reflux    Hyperlipidemia    Strangulated femoral hernia       Allergies:  Crab (diagnostic) - food allergy      Current Outpatient Medications:     atenolol (TENORMIN) 25 mg tablet, Take 25 mg by mouth daily, Disp: , Rfl:     EPINEPHrine (EPIPEN) 0.3 mg/0.3 mL SOAJ, Inject 0.3 mL (0.3 mg total) into a muscle once for 1 dose, Disp: 4 each, Rfl: 3    LORazepam (ATIVAN) 1 mg tablet, Take 1 tablet (1 mg total) by mouth every 8 (eight) hours as needed for anxiety, Disp: , Rfl:     Past Medical History:   Diagnosis Date    Anaphylaxis     Anxiety     Fissure, anal 1996       Past Surgical History:   Procedure Laterality Date    HERNIA REPAIR N/A 12/22/2023    Procedure: REPAIR OF STRANGULATED FEMORAL HERNIA, SMALL BOWEL RESECTION;  Surgeon: Reagan Dickerson DO;  Location[facility];  Service: General       No family history on file.     reports that she has never smoked. She has never used smokeless tobacco. She reports that she does not currently use alcohol after a past usage of about 2.0 standard drinks of alcohol per week. She reports that she does not use drugs.    PHYSICAL EXAM    There were no vitals taken for this visit.    General: normal, cooperative, no distress  Abdominal: soft, nondistended, or nontender  Incision: clean, dry, and intact and healing well healing ridge as expected over the right groin.  No signs of hernia recurrence      Reagan Dickerson DO    Date: 1/11/2024 Time: 10:36 AM

## 2024-07-09 ENCOUNTER — OFFICE VISIT (OUTPATIENT)
Dept: OBGYN CLINIC | Facility: CLINIC | Age: 59
End: 2024-07-09
Payer: COMMERCIAL

## 2024-07-09 VITALS
HEIGHT: 64 IN | BODY MASS INDEX: 22.88 KG/M2 | SYSTOLIC BLOOD PRESSURE: 130 MMHG | DIASTOLIC BLOOD PRESSURE: 80 MMHG | WEIGHT: 134 LBS | HEART RATE: 66 BPM

## 2024-07-09 DIAGNOSIS — R29.898 RIGHT HAND WEAKNESS: Primary | ICD-10-CM

## 2024-07-09 PROCEDURE — 99204 OFFICE O/P NEW MOD 45 MIN: CPT | Performed by: STUDENT IN AN ORGANIZED HEALTH CARE EDUCATION/TRAINING PROGRAM

## 2024-07-09 NOTE — PROGRESS NOTES
"ORTHOPAEDIC HAND, WRIST, AND ELBOW OFFICE  VISIT      ASSESSMENT/PLAN:      Diagnoses and all orders for this visit:    Right hand weakness  -     US MSK limited; Future  -     EMG 1 Limb; Future          58 y.o. female with right thumb and index weakness concern for FPL and FDP rupture versus AIN palsy     Discussed with the patient based off exam finding I have concern for right thumb FPL rupture and right index FDP rupture versus AIN palsy. A STAT MSK ultrasound ordered to evaluate FPL and FDP tendons. A STAT EMG was also ordered to evaluate AIN nerve. Discussed possible surgical intervention if tendon rupture is found. If AIN palsy, we will likely continue to monitor.       Follow Up:  After testing       To Do Next Visit:  Re-evaluation of current issue        Marvin Bhat MD  Attending, Orthopaedic Surgery  Hand, Wrist, and Elbow Surgery  [facility]    ______________________________________________________________________________________________    CHIEF COMPLAINT:  Chief Complaint   Patient presents with   • Right Wrist - Follow-up     Started having pain from  shoveling one day  in 2024 then hit the hand  on desk while at work.   Pain scale 0         SUBJECTIVE:  Patient is a 58 y.o. RHD female who presents today for evaluation and treatment of right wrist pain. The patient states in the Winter she was shoveling when she hit ice and felt a \"funny bone feeling\". She states most recently around April she she notes difficulty with thumb and index finger flexion. She denies any pain. The patient has a history of carpal tunnel during pregnancy which required bracing.     Occupation:       I have personally reviewed all the relevant PMH, PSH, SH, FH, Medications and allergies      PAST MEDICAL HISTORY:  Past Medical History:   Diagnosis Date   • Anaphylaxis    • Anxiety    • Fissure, anal 1996       PAST SURGICAL HISTORY:  Past Surgical History:   Procedure Laterality Date " "  • HERNIA REPAIR N/A 12/22/2023    Procedure: REPAIR OF STRANGULATED FEMORAL HERNIA, SMALL BOWEL RESECTION;  Surgeon: Reagan Dickerson DO;  Location[facility];  Service: General       FAMILY HISTORY:  History reviewed. No pertinent family history.    SOCIAL HISTORY:  Social History     Tobacco Use   • Smoking status: Never   • Smokeless tobacco: Never   Vaping Use   • Vaping status: Never Used   Substance Use Topics   • Alcohol use: Not Currently     Alcohol/week: 2.0 standard drinks of alcohol     Types: 2 Glasses of wine per week     Comment: socially   • Drug use: Never       MEDICATIONS:    Current Outpatient Medications:   •  atenolol (TENORMIN) 25 mg tablet, Take 25 mg by mouth daily, Disp: , Rfl:   •  EPINEPHrine (EPIPEN) 0.3 mg/0.3 mL SOAJ, Inject 0.3 mL (0.3 mg total) into a muscle once for 1 dose, Disp: 4 each, Rfl: 3  •  LORazepam (ATIVAN) 1 mg tablet, Take 1 tablet (1 mg total) by mouth every 8 (eight) hours as needed for anxiety, Disp: , Rfl:     ALLERGIES:  Allergies   Allergen Reactions   • Crab (Diagnostic) - Food Allergy Anaphylaxis           REVIEW OF SYSTEMS:  Musculoskeletal:        As noted in HPI.   All other systems reviewed and are negative.    VITALS:  Vitals:    07/09/24 1232   BP: 130/80   Pulse: 66       LABS:  HgA1c: No results found for: \"HGBA1C\"  BMP:   Lab Results   Component Value Date    CALCIUM 8.2 (L) 12/24/2023    K 3.5 12/24/2023    CO2 29 12/24/2023     12/24/2023    BUN 13 12/24/2023    CREATININE 0.67 12/24/2023       _____________________________________________________  PHYSICAL EXAMINATION:  General: Well developed and well nourished, alert & oriented x 3, appears comfortable  Psychiatric: Normal  HEENT: Normocephalic, Atraumatic Trachea Midline, No torticollis  Pulmonary: No audible wheezing or respiratory distress   Abdomen/GI: Non tender, non distended   Cardiovascular: No pitting edema, 2+ radial pulse   Skin: No masses, erythema, lacerations, fluctation, " ulcerations  Neurovascular: Sensation Intact to the Median, Ulnar, Radial Nerve, Motor Intact to the Median, Ulnar, Radial Nerve, and Pulses Intact  Musculoskeletal: Normal, except as noted in detailed exam and in HPI.      MUSCULOSKELETAL EXAMINATION:  Right wrist  12 mm by 12 mm by 12 mm H ganglion cyst over dorsum of wrist  FDP intact index through small  FDS intact index through long finger  FPL firing but weak  Thumb IP flexion active 40 but weak   EPL intact  5/5 APB  - tinel's at the wrist  Thumb with less IP flexion compared to contralateral side with passive wrist ROM  ___________________________________________________  STUDIES REVIEWED:  Xrays of the right hand and wrist were reviewed and independently interpreted in PACS by Dr. Bhat and demonstrate no osseous abnormalities.          PROCEDURES PERFORMED:  Procedures  No Procedures performed today    _____________________________________________________      Scribe Attestation    I,:  Marisol Rodriguez MA am acting as a scribe while in the presence of the attending physician.:       I,:  Marvin Bhat MD personally performed the services described in this documentation    as scribed in my presence.:

## 2024-07-09 NOTE — PROGRESS NOTES
ORTHOPAEDIC HAND, WRIST, AND ELBOW OFFICE  VISIT      ASSESSMENT/PLAN:      There are no diagnoses linked to this encounter.      58 y.o. female with ***  Treatment options and expected outcomes were discussed.  The patient verbalized understanding of exam findings and treatment plan.   The patient was given the opportunity to ask questions.  Questions were answered to the patient's satisfaction.  The patient decided to move forward with ***.      Follow Up:  {gsfollowup:44767}       To Do Next Visit:  {To do next visit:96845}      Discussions:  {gsdiscussions:39418}      Marvin Bhat MD  Attending, Orthopaedic Surgery  Hand, Wrist, and Elbow Surgery  [facility]    ______________________________________________________________________________________________    CHIEF COMPLAINT:  Chief Complaint   Patient presents with    Right Wrist - Follow-up     Started having pain from  shoveling one day  in 2024 then hit the hand  on desk while at work.   Pain scale 0         SUBJECTIVE:  Patient is a 58 y.o. ***HD female who presents today for evaluation and treatment of ***      Occupation: ***     I have personally reviewed all the relevant PMH, PSH, SH, FH, Medications and allergies      PAST MEDICAL HISTORY:  Past Medical History:   Diagnosis Date    Anaphylaxis     Anxiety     Fissure, anal 1996       PAST SURGICAL HISTORY:  Past Surgical History:   Procedure Laterality Date    HERNIA REPAIR N/A 12/22/2023    Procedure: REPAIR OF STRANGULATED FEMORAL HERNIA, SMALL BOWEL RESECTION;  Surgeon: Reagan Dickerson DO;  Location: [facility];  Service: General       FAMILY HISTORY:  History reviewed. No pertinent family history.    SOCIAL HISTORY:  Social History     Tobacco Use    Smoking status: Never    Smokeless tobacco: Never   Vaping Use    Vaping status: Never Used   Substance Use Topics    Alcohol use: Not Currently     Alcohol/week: 2.0 standard drinks of alcohol     Types: 2 Glasses of  "wine per week     Comment: socially    Drug use: Never       MEDICATIONS:    Current Outpatient Medications:     atenolol (TENORMIN) 25 mg tablet, Take 25 mg by mouth daily, Disp: , Rfl:     EPINEPHrine (EPIPEN) 0.3 mg/0.3 mL SOAJ, Inject 0.3 mL (0.3 mg total) into a muscle once for 1 dose, Disp: 4 each, Rfl: 3    LORazepam (ATIVAN) 1 mg tablet, Take 1 tablet (1 mg total) by mouth every 8 (eight) hours as needed for anxiety, Disp: , Rfl:     ALLERGIES:  Allergies   Allergen Reactions    Crab (Diagnostic) - Food Allergy Anaphylaxis           REVIEW OF SYSTEMS:  Musculoskeletal:        As noted in HPI.   All other systems reviewed and are negative.    VITALS:  Vitals:    07/09/24 1232   BP: 130/80   Pulse: 66       LABS:  HgA1c: No results found for: \"HGBA1C\"  BMP:   Lab Results   Component Value Date    CALCIUM 8.2 (L) 12/24/2023    K 3.5 12/24/2023    CO2 29 12/24/2023     12/24/2023    BUN 13 12/24/2023    CREATININE 0.67 12/24/2023       _____________________________________________________  PHYSICAL EXAMINATION:  General: Well developed and well nourished, alert & oriented x 3, appears comfortable  Psychiatric: Normal  HEENT: Normocephalic, Atraumatic Trachea Midline, No torticollis  Pulmonary: No audible wheezing or respiratory distress   Abdomen/GI: Non tender, non distended   Cardiovascular: No pitting edema, 2+ radial pulse   Skin: {Skin exam:71401}  Neurovascular: {Nerve Exam:07667::\"Sensation Intact to the Median, Ulnar, Radial Nerve\",\"Motor Intact to the Median, Ulnar, Radial Nerve\",\"Pulses Intact\"}  Musculoskeletal: Normal, except as noted in detailed exam and in HPI.      MUSCULOSKELETAL EXAMINATION:  ***    ___________________________________________________  STUDIES REVIEWED:  Xrays of the right hand were reviewed and independently interpreted in PACS by Dr. Bhat and demonstrate ***     Xrays of the right wrist were reviewed and independently interpreted in PACS by Dr. Bhat and " "demonstrate ***     Xrays of the cervical spine were reviewed and independently interpreted in PACS by Dr. Bhat and demonstrate ***       PROCEDURES PERFORMED:  Procedures  {Was Procdo done:34209::\"No Procedures performed today\"}    _____________________________________________________      Scribe Attestation      I,:  Kassie Means am acting as a scribe while in the presence of the attending physician.:       I,:  Marvin Bhat MD personally performed the services described in this documentation    as scribed in my presence.:            "

## 2024-07-16 ENCOUNTER — HOSPITAL ENCOUNTER (OUTPATIENT)
Dept: RADIOLOGY | Facility: HOSPITAL | Age: 59
Discharge: HOME | End: 2024-07-16
Attending: STUDENT IN AN ORGANIZED HEALTH CARE EDUCATION/TRAINING PROGRAM
Payer: COMMERCIAL

## 2024-07-16 DIAGNOSIS — R29.898 RIGHT HAND WEAKNESS: ICD-10-CM

## 2024-07-16 PROCEDURE — 76882 US LMTD JT/FCL EVL NVASC XTR: CPT

## 2024-07-17 ENCOUNTER — TELEPHONE (OUTPATIENT)
Dept: OBGYN CLINIC | Facility: CLINIC | Age: 59
End: 2024-07-17

## (undated) DEVICE — PLUMEPEN PRO 10FT

## (undated) DEVICE — BULB SYRINGE,IRRIGATION WITH PROTECTIVE CAP: Brand: DOVER

## (undated) DEVICE — GLOVE SRG BIOGEL ORTHOPEDIC 8

## (undated) DEVICE — NEEDLE 23G X 1 1/2 SAFETY-GLIDE THIN WALL

## (undated) DEVICE — PROXIMATE LINEAR CUTTER RELOAD, BLUE, 75MM: Brand: PROXIMATE

## (undated) DEVICE — SUT VICRYL 2-0 SH 27 IN UNDYED J417H

## (undated) DEVICE — INTENDED FOR TISSUE SEPARATION, AND OTHER PROCEDURES THAT REQUIRE A SHARP SURGICAL BLADE TO PUNCTURE OR CUT.: Brand: BARD-PARKER SAFETY BLADES SIZE 15, STERILE

## (undated) DEVICE — BETHLEHEM UNIVERSAL MINOR GEN: Brand: CARDINAL HEALTH

## (undated) DEVICE — TOWEL SURG XR DETECT GREEN STRL RFD

## (undated) DEVICE — DECANTER: Brand: UNBRANDED

## (undated) DEVICE — ADHESIVE SKIN HIGH VISCOSITY EXOFIN 1ML

## (undated) DEVICE — SUT MONOCRYL 4-0 PS-2 27 IN Y426H

## (undated) DEVICE — CHLORAPREP HI-LITE 26ML ORANGE

## (undated) DEVICE — PROXIMATE RELOADABLE LINEAR STAPLER: Brand: PROXIMATE

## (undated) DEVICE — PROXIMATE RELOADABLE LINEAR CUTTER WITH SAFETY LOCK-OUT, 75MM: Brand: PROXIMATE